# Patient Record
Sex: MALE | Race: WHITE | NOT HISPANIC OR LATINO | Employment: OTHER | URBAN - METROPOLITAN AREA
[De-identification: names, ages, dates, MRNs, and addresses within clinical notes are randomized per-mention and may not be internally consistent; named-entity substitution may affect disease eponyms.]

---

## 2017-03-13 ENCOUNTER — COMMUNICATION - HEALTHEAST (OUTPATIENT)
Dept: INTERNAL MEDICINE | Facility: CLINIC | Age: 62
End: 2017-03-13

## 2017-07-10 ENCOUNTER — RECORDS - HEALTHEAST (OUTPATIENT)
Dept: ADMINISTRATIVE | Facility: OTHER | Age: 62
End: 2017-07-10

## 2017-07-11 ENCOUNTER — RECORDS - HEALTHEAST (OUTPATIENT)
Dept: ADMINISTRATIVE | Facility: OTHER | Age: 62
End: 2017-07-11

## 2018-02-09 ENCOUNTER — COMMUNICATION - HEALTHEAST (OUTPATIENT)
Dept: INTERNAL MEDICINE | Facility: CLINIC | Age: 63
End: 2018-02-09

## 2018-02-11 ENCOUNTER — COMMUNICATION - HEALTHEAST (OUTPATIENT)
Dept: INTERNAL MEDICINE | Facility: CLINIC | Age: 63
End: 2018-02-11

## 2018-05-24 ENCOUNTER — COMMUNICATION - HEALTHEAST (OUTPATIENT)
Dept: INTERNAL MEDICINE | Facility: CLINIC | Age: 63
End: 2018-05-24

## 2018-05-25 ENCOUNTER — OFFICE VISIT - HEALTHEAST (OUTPATIENT)
Dept: INTERNAL MEDICINE | Facility: CLINIC | Age: 63
End: 2018-05-25

## 2018-05-25 DIAGNOSIS — Z00.00 HEALTH CARE MAINTENANCE: ICD-10-CM

## 2018-05-25 LAB — PSA SERPL-MCNC: 2.4 NG/ML (ref 0–4.5)

## 2018-05-25 ASSESSMENT — MIFFLIN-ST. JEOR: SCORE: 1876.65

## 2019-02-22 ENCOUNTER — COMMUNICATION - HEALTHEAST (OUTPATIENT)
Dept: INTERNAL MEDICINE | Facility: CLINIC | Age: 64
End: 2019-02-22

## 2019-05-21 ENCOUNTER — COMMUNICATION - HEALTHEAST (OUTPATIENT)
Dept: INTERNAL MEDICINE | Facility: CLINIC | Age: 64
End: 2019-05-21

## 2019-05-21 DIAGNOSIS — E03.9 HYPOTHYROIDISM, UNSPECIFIED TYPE: ICD-10-CM

## 2019-07-08 ENCOUNTER — RECORDS - HEALTHEAST (OUTPATIENT)
Dept: ADMINISTRATIVE | Facility: OTHER | Age: 64
End: 2019-07-08

## 2019-07-09 ENCOUNTER — RECORDS - HEALTHEAST (OUTPATIENT)
Dept: ADMINISTRATIVE | Facility: OTHER | Age: 64
End: 2019-07-09

## 2019-07-10 ENCOUNTER — RECORDS - HEALTHEAST (OUTPATIENT)
Dept: ADMINISTRATIVE | Facility: OTHER | Age: 64
End: 2019-07-10

## 2019-08-17 ENCOUNTER — COMMUNICATION - HEALTHEAST (OUTPATIENT)
Dept: INTERNAL MEDICINE | Facility: CLINIC | Age: 64
End: 2019-08-17

## 2019-08-17 DIAGNOSIS — E03.9 HYPOTHYROIDISM, UNSPECIFIED TYPE: ICD-10-CM

## 2019-09-20 ENCOUNTER — RECORDS - HEALTHEAST (OUTPATIENT)
Dept: ADMINISTRATIVE | Facility: OTHER | Age: 64
End: 2019-09-20

## 2019-11-13 ENCOUNTER — COMMUNICATION - HEALTHEAST (OUTPATIENT)
Dept: INTERNAL MEDICINE | Facility: CLINIC | Age: 64
End: 2019-11-13

## 2019-11-13 DIAGNOSIS — E03.9 HYPOTHYROIDISM, UNSPECIFIED TYPE: ICD-10-CM

## 2019-12-05 ENCOUNTER — OFFICE VISIT - HEALTHEAST (OUTPATIENT)
Dept: INTERNAL MEDICINE | Facility: CLINIC | Age: 64
End: 2019-12-05

## 2019-12-05 DIAGNOSIS — I42.2 HYPERTROPHIC CARDIOMYOPATHY (H): ICD-10-CM

## 2019-12-05 DIAGNOSIS — D30.01 RENAL ONCOCYTOMA OF RIGHT KIDNEY: ICD-10-CM

## 2019-12-05 DIAGNOSIS — I25.10 CORONARY ARTERY DISEASE INVOLVING NATIVE CORONARY ARTERY OF NATIVE HEART WITHOUT ANGINA PECTORIS: ICD-10-CM

## 2019-12-05 DIAGNOSIS — I10 ESSENTIAL HYPERTENSION: ICD-10-CM

## 2019-12-05 DIAGNOSIS — E78.5 HYPERLIPIDEMIA WITH TARGET LDL LESS THAN 70: ICD-10-CM

## 2019-12-05 DIAGNOSIS — E03.9 HYPOTHYROIDISM, UNSPECIFIED TYPE: ICD-10-CM

## 2019-12-05 LAB
ANION GAP SERPL CALCULATED.3IONS-SCNC: 8 MMOL/L (ref 5–18)
BUN SERPL-MCNC: 13 MG/DL (ref 8–22)
CALCIUM SERPL-MCNC: 9.7 MG/DL (ref 8.5–10.5)
CHLORIDE BLD-SCNC: 100 MMOL/L (ref 98–107)
CO2 SERPL-SCNC: 31 MMOL/L (ref 22–31)
CREAT SERPL-MCNC: 1.13 MG/DL (ref 0.7–1.3)
GFR SERPL CREATININE-BSD FRML MDRD: >60 ML/MIN/1.73M2
GLUCOSE BLD-MCNC: 95 MG/DL (ref 70–125)
POTASSIUM BLD-SCNC: 3.3 MMOL/L (ref 3.5–5)
SODIUM SERPL-SCNC: 139 MMOL/L (ref 136–145)
TSH SERPL DL<=0.005 MIU/L-ACNC: 0.33 UIU/ML (ref 0.3–5)

## 2019-12-05 RX ORDER — HYDROCHLOROTHIAZIDE 50 MG/1
50 TABLET ORAL DAILY
Refills: 0 | Status: SHIPPED
Start: 2019-12-05 | End: 2022-07-26

## 2019-12-05 RX ORDER — DILTIAZEM HYDROCHLORIDE 180 MG/1
180 CAPSULE, COATED, EXTENDED RELEASE ORAL
Status: SHIPPED | COMMUNITY
Start: 2019-09-17 | End: 2024-01-09

## 2019-12-05 ASSESSMENT — MIFFLIN-ST. JEOR: SCORE: 1917.48

## 2019-12-06 ENCOUNTER — AMBULATORY - HEALTHEAST (OUTPATIENT)
Dept: INTERNAL MEDICINE | Facility: CLINIC | Age: 64
End: 2019-12-06

## 2019-12-06 ENCOUNTER — COMMUNICATION - HEALTHEAST (OUTPATIENT)
Dept: INTERNAL MEDICINE | Facility: CLINIC | Age: 64
End: 2019-12-06

## 2019-12-06 DIAGNOSIS — E87.6 LOW SERUM POTASSIUM: ICD-10-CM

## 2019-12-06 DIAGNOSIS — I10 ESSENTIAL HYPERTENSION: ICD-10-CM

## 2019-12-13 ENCOUNTER — RECORDS - HEALTHEAST (OUTPATIENT)
Dept: ADMINISTRATIVE | Facility: OTHER | Age: 64
End: 2019-12-13

## 2020-01-07 ENCOUNTER — RECORDS - HEALTHEAST (OUTPATIENT)
Dept: ADMINISTRATIVE | Facility: OTHER | Age: 65
End: 2020-01-07

## 2020-02-01 ENCOUNTER — COMMUNICATION - HEALTHEAST (OUTPATIENT)
Dept: INTERNAL MEDICINE | Facility: CLINIC | Age: 65
End: 2020-02-01

## 2020-02-01 DIAGNOSIS — E03.9 HYPOTHYROIDISM, UNSPECIFIED TYPE: ICD-10-CM

## 2020-03-27 ENCOUNTER — OFFICE VISIT - HEALTHEAST (OUTPATIENT)
Dept: INTERNAL MEDICINE | Facility: CLINIC | Age: 65
End: 2020-03-27

## 2020-03-27 DIAGNOSIS — I10 ESSENTIAL HYPERTENSION: ICD-10-CM

## 2020-03-27 DIAGNOSIS — I25.119 CORONARY ARTERY DISEASE INVOLVING NATIVE CORONARY ARTERY OF NATIVE HEART WITH ANGINA PECTORIS (H): ICD-10-CM

## 2020-03-27 RX ORDER — NITROGLYCERIN 0.4 MG/1
0.4 TABLET SUBLINGUAL EVERY 5 MIN PRN
Qty: 30 TABLET | Refills: 3 | Status: SHIPPED | OUTPATIENT
Start: 2020-03-27 | End: 2024-01-09

## 2020-03-27 RX ORDER — LISINOPRIL 10 MG/1
10 TABLET ORAL 2 TIMES DAILY
Qty: 180 TABLET | Refills: 11 | Status: SHIPPED
Start: 2020-03-27 | End: 2022-07-26

## 2021-02-03 ENCOUNTER — COMMUNICATION - HEALTHEAST (OUTPATIENT)
Dept: INTERNAL MEDICINE | Facility: CLINIC | Age: 66
End: 2021-02-03

## 2021-02-10 ENCOUNTER — OFFICE VISIT - HEALTHEAST (OUTPATIENT)
Dept: INTERNAL MEDICINE | Facility: CLINIC | Age: 66
End: 2021-02-10

## 2021-02-10 DIAGNOSIS — E66.3 OVERWEIGHT (BMI 25.0-29.9): ICD-10-CM

## 2021-02-10 DIAGNOSIS — I42.2 HYPERTROPHIC CARDIOMYOPATHY (H): ICD-10-CM

## 2021-02-10 DIAGNOSIS — I10 ESSENTIAL HYPERTENSION, BENIGN: ICD-10-CM

## 2021-02-10 DIAGNOSIS — Z86.0100 PERSONAL HISTORY OF COLONIC POLYPS: ICD-10-CM

## 2021-02-10 DIAGNOSIS — E03.9 HYPOTHYROIDISM, UNSPECIFIED TYPE: ICD-10-CM

## 2021-02-10 DIAGNOSIS — R39.12 BENIGN PROSTATIC HYPERPLASIA WITH WEAK URINARY STREAM: ICD-10-CM

## 2021-02-10 DIAGNOSIS — I25.119 CORONARY ARTERY DISEASE INVOLVING NATIVE CORONARY ARTERY OF NATIVE HEART WITH ANGINA PECTORIS (H): ICD-10-CM

## 2021-02-10 DIAGNOSIS — N40.1 BENIGN PROSTATIC HYPERPLASIA WITH WEAK URINARY STREAM: ICD-10-CM

## 2021-02-10 LAB — TSH SERPL DL<=0.005 MIU/L-ACNC: 0.35 UIU/ML (ref 0.3–5)

## 2021-03-31 ENCOUNTER — COMMUNICATION - HEALTHEAST (OUTPATIENT)
Dept: INTERNAL MEDICINE | Facility: CLINIC | Age: 66
End: 2021-03-31

## 2021-03-31 DIAGNOSIS — E03.9 HYPOTHYROIDISM, UNSPECIFIED TYPE: ICD-10-CM

## 2021-03-31 RX ORDER — LEVOTHYROXINE SODIUM 125 UG/1
TABLET ORAL
Qty: 90 TABLET | Refills: 3 | Status: SHIPPED | OUTPATIENT
Start: 2021-03-31 | End: 2022-06-02

## 2021-04-26 ENCOUNTER — RECORDS - HEALTHEAST (OUTPATIENT)
Dept: ADMINISTRATIVE | Facility: OTHER | Age: 66
End: 2021-04-26

## 2021-05-29 NOTE — TELEPHONE ENCOUNTER
RN cannot approve Refill Request    RN can NOT refill this medication overdue for office visits and/or labs.    Antione Champion, [facility] Triage/Med Refill 5/21/2019    Requested Prescriptions   Pending Prescriptions Disp Refills     levothyroxine (SYNTHROID, LEVOTHROID) 125 MCG tablet [Pharmacy Med Name: LEVOTHYROXINE 0.125MG (125MCG) TAB] 90 tablet 0     Sig: TAKE 1 TABLET BY MOUTH DAILY AT 6 AM       Thyroid Hormones Protocol Failed - 5/21/2019 11:41 AM        Failed - TSH on file in past 12 months for patient age 12 & older     TSH   Date Value Ref Range Status   10/30/2015 2.45 0.30 - 5.00 uIU/mL Final                   Passed - Provider visit in past 12 months or next 3 months     Last office visit with prescriber/PCP: Visit date not found OR same dept: Visit date not found OR same specialty: 10/30/2015 Jhonathan Cline MD  Last physical: Visit date not found Last MTM visit: Visit date not found   Next visit within 3 mo: Visit date not found  Next physical within 3 mo: Visit date not found  Prescriber OR PCP: Tae Gomez MD  Last diagnosis associated with med order: There are no diagnoses linked to this encounter.  If protocol passes may refill for 12 months if within 3 months of last provider visit (or a total of 15 months).

## 2021-05-31 NOTE — TELEPHONE ENCOUNTER
RN cannot approve Refill Request    RN can NOT refill this medication PCP messaged that patient is overdue for Labs and Office Visit and Protocol failed and NO refill given.   Sun Vizcarra, [facility] Triage/Med Refill 8/17/2019    Requested Prescriptions   Pending Prescriptions Disp Refills     levothyroxine (SYNTHROID, LEVOTHROID) 125 MCG tablet [Pharmacy Med Name: LEVOTHYROXINE 0.125MG (125MCG) TAB] 90 tablet 0     Sig: TAKE 1 TABLET BY MOUTH DAILY AT 6 AM       Thyroid Hormones Protocol Failed - 8/17/2019 12:36 PM        Failed - Provider visit in past 12 months or next 3 months     Last office visit with prescriber/PCP: 10/30/2015 Jhonathan Cline MD OR same dept: Visit date not found OR same specialty: 10/30/2015 Jhonathan Cline MD  Last physical: 5/25/2018 Last MTM visit: Visit date not found   Next visit within 3 mo: Visit date not found  Next physical within 3 mo: Visit date not found  Prescriber OR PCP: Jhonathan Cline MD  Last diagnosis associated with med order: 1. Hypothyroidism, unspecified type  - levothyroxine (SYNTHROID, LEVOTHROID) 125 MCG tablet [Pharmacy Med Name: LEVOTHYROXINE 0.125MG (125MCG) TAB]; TAKE 1 TABLET BY MOUTH DAILY AT 6 AM  Dispense: 90 tablet; Refill: 0    If protocol passes may refill for 12 months if within 3 months of last provider visit (or a total of 15 months).             Failed - TSH on file in past 12 months for patient age 12 & older     TSH   Date Value Ref Range Status   10/30/2015 2.45 0.30 - 5.00 uIU/mL Final

## 2021-06-01 VITALS — WEIGHT: 220.04 LBS | BODY MASS INDEX: 26.8 KG/M2 | HEIGHT: 76 IN

## 2021-06-03 NOTE — TELEPHONE ENCOUNTER
RN cannot approve Refill Request    RN can NOT refill this medication PCP messaged that patient is overdue for Labs and Office Visit. Last office visit: 10/30/2015 Jhonathan Cline MD Last Physical: 5/25/2018 Last MTM visit: Visit date not found Last visit same specialty: 10/30/2015 Jhonathan Cline MD.  Next visit within 3 mo: Visit date not found  Next physical within 3 mo: Visit date not found      Franchesca LUEVANO Kristina, [facility] Triage/Med Refill 11/13/2019    Requested Prescriptions   Pending Prescriptions Disp Refills     levothyroxine (SYNTHROID, LEVOTHROID) 125 MCG tablet [Pharmacy Med Name: LEVOTHYROXINE 0.125MG (125MCG) TAB] 90 tablet 0     Sig: TAKE 1 TABLET BY MOUTH DAILY AT 6 AM       Thyroid Hormones Protocol Failed - 11/13/2019  8:07 AM        Failed - Provider visit in past 12 months or next 3 months     Last office visit with prescriber/PCP: 10/30/2015 Jhonathan Cline MD OR same dept: Visit date not found OR same specialty: 10/30/2015 Jhonathan Cline MD  Last physical: 5/25/2018 Last MTM visit: Visit date not found   Next visit within 3 mo: Visit date not found  Next physical within 3 mo: Visit date not found  Prescriber OR PCP: Jhonathan Cline MD  Last diagnosis associated with med order: 1. Hypothyroidism, unspecified type  - levothyroxine (SYNTHROID, LEVOTHROID) 125 MCG tablet [Pharmacy Med Name: LEVOTHYROXINE 0.125MG (125MCG) TAB]; TAKE 1 TABLET BY MOUTH DAILY AT 6 AM  Dispense: 90 tablet; Refill: 0    If protocol passes may refill for 12 months if within 3 months of last provider visit (or a total of 15 months).             Failed - TSH on file in past 12 months for patient age 12 & older     TSH   Date Value Ref Range Status   10/30/2015 2.45 0.30 - 5.00 uIU/mL Final

## 2021-06-04 VITALS
OXYGEN SATURATION: 97 % | DIASTOLIC BLOOD PRESSURE: 92 MMHG | WEIGHT: 229.04 LBS | HEIGHT: 76 IN | SYSTOLIC BLOOD PRESSURE: 145 MMHG | HEART RATE: 56 BPM | BODY MASS INDEX: 27.89 KG/M2

## 2021-06-05 NOTE — TELEPHONE ENCOUNTER
Refill Approved    Rx renewed per Medication Renewal Policy. Medication was last renewed on11/13/19 .    Zaida Dela Cruz, [facility] Triage/Med Refill 2/2/2020     Requested Prescriptions   Pending Prescriptions Disp Refills     levothyroxine (SYNTHROID, LEVOTHROID) 125 MCG tablet [Pharmacy Med Name: LEVOTHYROXINE 0.125MG (125MCG) TAB] 90 tablet 0     Sig: TAKE 1 TABLET BY MOUTH DAILY AT 6 AM       Thyroid Hormones Protocol Passed - 2/1/2020  8:33 AM        Passed - Provider visit in past 12 months or next 3 months     Last office visit with prescriber/PCP: 12/5/2019 Jhonathan Cline MD OR same dept: 12/5/2019 Jhonathan Cline MD OR same specialty: 12/5/2019 Jhonathan Cline MD  Last physical: 5/25/2018 Last MTM visit: Visit date not found   Next visit within 3 mo: Visit date not found  Next physical within 3 mo: Visit date not found  Prescriber OR PCP: Jhonathan Cline MD  Last diagnosis associated with med order: 1. Hypothyroidism, unspecified type  - levothyroxine (SYNTHROID, LEVOTHROID) 125 MCG tablet [Pharmacy Med Name: LEVOTHYROXINE 0.125MG (125MCG) TAB]; TAKE 1 TABLET BY MOUTH DAILY AT 6 AM  Dispense: 90 tablet; Refill: 0    If protocol passes may refill for 12 months if within 3 months of last provider visit (or a total of 15 months).             Passed - TSH on file in past 12 months for patient age 12 & older     TSH   Date Value Ref Range Status   12/05/2019 0.33 0.30 - 5.00 uIU/mL Final

## 2021-06-07 NOTE — PROGRESS NOTES
"Jan Reyers is a 64 y.o. male who is being evaluated via a billable telephone visit.      The patient has been notified of following:     \"This telephone visit will be conducted via a call between you and your physician/provider. We have found that certain health care needs can be provided without the need for a physical exam.  This service lets us provide the care you need with a short phone conversation.  If a prescription is necessary we can send it directly to your pharmacy.  If lab work is needed we can place an order for that and you can then stop by our lab to have the test done at a later time.    If during the course of the call the physician/provider feels a telephone visit is not appropriate, you will not be charged for this service.\"         Jan Reyers complains of    Chief Complaint   Patient presents with     Follow-up     3 month follow up- blood pressure       I have reviewed and updated the patient's Past Medical History, Social History, Family History and Medication List.    ALLERGIES  Patient has no known allergies.    Additional provider notes: Patient is having some indigestion  Epigastric area when he is on his aggressive walking program.  It will go away in about 10 minutes.  He recently in September underwent a stress test.  He went 11 minutes.  He has known subendocardial type changes.  He does have a stent    Things have not progressed since the time of his last stress test    He has not had any evaristo chest pain or shortness of breath.    No headache or TIA symptomatology.    Assessment/Plan:  Ischemic heart disease with history of stents.  I suspect he has warm of angina.  He will take his a.m. lisinopril and a.m. hydrochlorothiazide after his walk.  Will start walking at a slow pace before he accelerates.  I have also given him some nitroglycerin to have on hand which he knows how to use but has never done so    Hypertension improved control with increase lisinopril    Hyperlipidemia on " Rx    He will email me in a week to let me know how things are.  He does have an upcoming stress test in July at the [facility]    Medications hydrochlorothiazide 50  Lisinopril 10 twice daily  Metoprolol 12.5.  Aspirin 81   Phone call duration:  18 minutes    Remedios Gee, [facility]

## 2021-06-14 NOTE — TELEPHONE ENCOUNTER
New Appointment Needed  What is the reason for the visit:    New patient est care request  Provider Preference: Dr. Wilkerson - Dr. Harper made a recommendation  How soon do you need to be seen?: as soon as possible for AWV  Waitlist offered?: No  Okay to leave a detailed message:  Yes

## 2021-06-15 NOTE — PATIENT INSTRUCTIONS - HE
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  He was previously working with Dr. Aly Maddox.  Issues are as follows: Hypertrophic cardiomyopathy and coronary artery disease, with history of two-vessel coronary bypass grafting in 2004, not experiencing any cardiac symptoms.  Hyperlipidemia in the context of coronary disease, on high intensity rosuvastatin with great control of lipids when measured 6/1/2020.  Hypertension, systolic blood pressure a little elevated today, currently on combination of lisinopril 10 mg once a day and diltiazem extended release 180 mg once a day and hydrochlorothiazide 50 mg once a day.  Renal oncocytoma, status post resection of 2 lesions in 2014, slight increase in the left-sided mass by CT scan 6/1/2020, continue annual surveillance.  Abdominal aortic aneurysm measured 3.5 cm by CT scan June 2020, continue annual surveillance.  Benign prostatic hyperplasia, he struggles a bit empty his bladder completely.  Personal history of tubular adenoma colon polyp November 2015 done at [facility], due for recheck.  Got Covid vaccine number 1/1/2021, will get second shot February 18.  He should get additional vaccinations afterwards for pneumococcal polysaccharide 23, shingles (2 shots of the recombinant vaccine administered 3 months apart), and could also consider getting a tetanus booster.    Today lets get a TSH level so that we can make sure is on the right dose of levothyroxine.    Going issue by issue:    Hypertrophic cardiomyopathy and coronary artery disease, with history of two-vessel coronary bypass grafting in 2004, not experiencing any cardiac symptoms.  His [facility] cardiologist is Dr. Antione Grijalva.  Cardiac MRI July 2019 reported hypertrophic cardiomyopathy with 21 mm septal thickness.  Holter monitor July 2019 with rare supraventricular  ectopy and no significant ventricular ectopy.  Lisinopril was discontinued at that time, but Dr. Maddox added back in 2020 because of blood pressure elevation.  Echocardiogram July 2019 reported resting gradient of 6 mm increasing to 36 mmHg with squat to stand maneuver.  He has not had any severe rhythm events, and does not have any implanted devices.  No fainting spells.  No known family history of hypertrophic cardiomyopathy or sudden cardiac death.    Regarding coronary disease, he had an exercise stress test with nuclear myocardial imaging 6/1/2020, which demonstrated an excellent exercise tolerance, going 12.7 minutes on the Reji protocol, 142% of predicted functional aerobic capacity.  The radionuclide imaging showed no signs of stress-induced ischemia.  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  I told Mr. 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    Hyperlipidemia in the context of coronary disease, on high intensity rosuvastatin with great control of lipids when measured 6/1/2020.  The patient's recent lipid panel showed an HDL of 51 mg/dL and an LDL of 70 mg/dL. He can remain on rosuvastatin 40 mg daily.    Hypertension, systolic blood pressure a little elevated today, currently on combination of lisinopril 10 mg once a day and diltiazem extended release 180 mg once a day and hydrochlorothiazide 50 mg once a day.  Today's in clinic blood pressure was 156/81.  I think he might consider bumping up the lisinopril from 10 up to 20 mg a day.  I would favor being pretty aggressive with blood pressure control, aiming for consistent 120/80 at rest.  Kidney function has been satisfactory, and has been maintaining his potassium levels even though he  is on a pretty hefty dose of hydrochlorothiazide.    The blood pressure is much better if he is consistent with exercise.  That may be the best way to manage.  I also reminded him about the importance of controlling dietary sodium intake, and also best to avoid alcohol.    Renal oncocytoma, status post resection of 2 lesions in 2014, slight increase in the left-sided mass by CT scan 6/1/2020, continue annual surveillance.  CT scan of 6/1/2020 reported that the heterogeneous enhancing mass in the lower pole of the left kidney medially measured 2.3 cm in greatest diameter.  Also postoperative changes of partial right nephrectomy.  Stable small at all aortic aneurysm measuring 3.5 cm greatest diameter.    Abdominal aortic aneurysm measured 3.5 cm by CT scan June 2020, continue annual surveillance.      Benign prostatic hyperplasia, he struggles a bit empty his bladder completely.  I told him there are medication options to treat BPH to help empty his bladder.  One option might be tamsulosin 0.4 mg a day, although tamsulosin may lower blood pressure.  If he were to start tamsulosin, he may be necessary to back down on one of his other blood pressure medicines    Personal history of tubular adenoma colon polyp November 2015 done at [facility], due for recheck.  He received a reminder letter from [facility], and will call to schedule    Overweight with body mass index of 28.37.  I would like to see him lose about 15 to 20 pounds in 2021.  That will help with blood pressure control, take a load off his heart, help with cholesterol numbers too.  I reminded about the importance of eating slower, controlling portion size, and identifying problem foods to curtail her limited, such as starches, sweets, and fried foods.  I reminded him that alcohol is a source of carbohydrate calories.    Got Covid vaccine number 1/1/2021, will get second shot February 18.    He should get additional vaccinations afterwards for  pneumococcal polysaccharide 23, shingles (2 shots of the recombinant vaccine administered 3 months apart), and could also consider getting a tetanus booster.

## 2021-06-15 NOTE — TELEPHONE ENCOUNTER
Patient scheduled with Dr. Early for 2/10/2021.  Angely Anderson CMA ............... 12:55 PM, 02/04/21

## 2021-06-15 NOTE — PROGRESS NOTES
Office Visit - Follow Up   Jan Reyers   65 y.o. 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  He was previously working with Dr. Aly Maddox.  Issues are as follows: Hypertrophic cardiomyopathy and coronary artery disease, with history of two-vessel coronary bypass grafting in 2004, not experiencing any cardiac symptoms.  Hyperlipidemia in the context of coronary disease, on high intensity rosuvastatin with great control of lipids when measured 6/1/2020.  Hypertension, systolic blood pressure a little elevated today, currently on combination of lisinopril 10 mg once a day and diltiazem extended release 180 mg once a day and hydrochlorothiazide 50 mg once a day.  Renal oncocytoma, status post resection of 2 lesions in 2014, slight increase in the left-sided mass by CT scan 6/1/2020, continue annual surveillance.  Abdominal aortic aneurysm measured 3.5 cm by CT scan June 2020, continue annual surveillance.  Benign prostatic hyperplasia, he struggles a bit empty his bladder completely.  Personal history of tubular adenoma colon polyp November 2015 done at [facility], due for recheck. Overweight with body mass index of 28.37.  Got Covid vaccine number 1/1/2021, will get second shot February 18.  He should get additional vaccinations afterwards for pneumococcal polysaccharide 23, shingles (2 shots of the recombinant vaccine administered 3 months apart), and could also consider getting a tetanus booster.    Today lets get a TSH level so that we can make sure is on the right dose of levothyroxine.    Going issue by issue:    Hypertrophic  cardiomyopathy and coronary artery disease, with history of two-vessel coronary bypass grafting in 2004, not experiencing any cardiac symptoms.  His [facility] cardiologist is Dr. Antione Grijalva.  Cardiac MRI July 2019 reported hypertrophic cardiomyopathy with 21 mm septal thickness.  Holter monitor July 2019 with rare supraventricular ectopy and no significant ventricular ectopy.  Lisinopril was discontinued at that time, but Dr. Maddox added back in 2020 because of blood pressure elevation.  Echocardiogram July 2019 reported resting gradient of 6 mm increasing to 36 mmHg with squat to stand maneuver.  He has not had any severe rhythm events, and does not have any implanted devices.  No fainting spells.  No known family history of hypertrophic cardiomyopathy or sudden cardiac death.    Regarding coronary disease, he had an exercise stress test with nuclear myocardial imaging 6/1/2020, which demonstrated an excellent exercise tolerance, going 12.7 minutes on the Reji protocol, 142% of predicted functional aerobic capacity.  The radionuclide imaging showed no signs of stress-induced ischemia.  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  I told Mr. 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    Hyperlipidemia in the context of coronary disease, on high intensity rosuvastatin with great control of lipids when measured 6/1/2020.  The patient's recent lipid panel showed an HDL of 51 mg/dL and an LDL of 70 mg/dL. He can remain on rosuvastatin 40 mg daily.    Hypertension, systolic blood pressure a little elevated today, currently on combination of lisinopril 10 mg once a day and diltiazem extended release 180 mg once a day and hydrochlorothiazide 50 mg once  a day.  Today's in clinic blood pressure was 156/81.  I think he might consider bumping up the lisinopril from 10 up to 20 mg a day.  I would favor being pretty aggressive with blood pressure control, aiming for consistent 120/80 at rest.  Kidney function has been satisfactory, and has been maintaining his potassium levels even though he is on a pretty hefty dose of hydrochlorothiazide.    The blood pressure is much better if he is consistent with exercise.  That may be the best way to manage.  I also reminded him about the importance of controlling dietary sodium intake, and also best to avoid alcohol.    Renal oncocytoma, status post resection of 2 lesions in 2014, slight increase in the left-sided mass by CT scan 6/1/2020, continue annual surveillance.  CT scan of 6/1/2020 reported that the heterogeneous enhancing mass in the lower pole of the left kidney medially measured 2.3 cm in greatest diameter.  Also postoperative changes of partial right nephrectomy.  Stable small at all aortic aneurysm measuring 3.5 cm greatest diameter.    Abdominal aortic aneurysm measured 3.5 cm by CT scan June 2020, continue annual surveillance.      Benign prostatic hyperplasia, he struggles a bit empty his bladder completely.  I told him there are medication options to treat BPH to help empty his bladder.  One option might be tamsulosin 0.4 mg a day, although tamsulosin may lower blood pressure.  If he were to start tamsulosin, he may be necessary to back down on one of his other blood pressure medicines    Personal history of tubular adenoma colon polyp November 2015 done at [facility], due for recheck.  He received a reminder letter from [facility], and will call to schedule    Overweight with body mass index of 28.37.  I would like to see him lose about 15 to 20 pounds in 2021.  That will help with blood pressure control, take a load off his heart, help with cholesterol numbers too.  I reminded about the importance  of eating slower, controlling portion size, and identifying problem foods to curtail her limited, such as starches, sweets, and fried foods.  I reminded him that alcohol is a source of carbohydrate calories.    Got Covid vaccine number 1/1/2021, will get second shot February 18.    He should get additional vaccinations afterwards for pneumococcal polysaccharide 23, shingles (2 shots of the recombinant vaccine administered 3 months apart), and could also consider getting a tetanus booster.      --------------------------------------------------------------------------------------------------------------------------  History of Present Illness  This 65 y.o. old     Establishing primary care for this 65-year-old man, retired [employer], who also gets medical care at [facility] from the executive health program and also [facility] cardiology.  He was previously working with Dr. Aly Maddox.  Issues are as follows: Hypertrophic cardiomyopathy and coronary artery disease, with history of two-vessel coronary bypass grafting in 2004, not experiencing any cardiac symptoms.  Hyperlipidemia in the context of coronary disease, on high intensity rosuvastatin with great control of lipids when measured 6/1/2020.  Hypertension, systolic blood pressure a little elevated today, currently on combination of lisinopril 10 mg once a day and diltiazem extended release 180 mg once a day and hydrochlorothiazide 50 mg once a day.  Renal oncocytoma, status post resection of 2 lesions in 2014, slight increase in the left-sided mass by CT scan 6/1/2020, continue annual surveillance.  Abdominal aortic aneurysm measured 3.5 cm by CT scan June 2020, continue annual surveillance.  Benign prostatic hyperplasia, he struggles a bit empty his bladder completely.  Personal history of tubular adenoma colon polyp November 2015 done at [facility], due for recheck. Overweight with body mass index of 28.37.  Got Covid vaccine number 1/1/2021, will  get second shot February 18.  He should get additional vaccinations afterwards for pneumococcal polysaccharide 23, shingles (2 shots of the recombinant vaccine administered 3 months apart), and could also consider getting a tetanus booster.      [place]:  Bandar Alford M.D., M.S. 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 He also has [facility] qualification for hypertension and hypothyroidism requiring replacement.    coronary artery disease and a very slowly increasing oncocytoma involving the right kidney. Mr. Reyers underwent a two-vessel CABG in 2004 and has remained asymptomatic without anginal or congestive symptoms. He also has a previously reported history of hypothyroidism stable on thyroid replacement, and hypertension controlled with lisinopril and hydrochlorothiazide.     He underwent an exercise stress test here on June 1, 2020 during which time he exercised for 12.7 minutes on a full Reji protocol (142% predicted functional aerobic capacity) and the radionuclide imaging showed no evidence for stress-induced ischemia. There was no evidence for left ventricular regional wall motion abnormalities and his ejection fraction was normal at 64%  he has sigmoid variant hypertrophic cardiomyopathy with very mild obstruction that is clearly not clinically significant.     PAST MEDICAL/SURGICAL HISTORY  1. Right renal oncocytoma with resection of two lesions in 2014.  2. Left renal cyst.  3. Coronary artery disease.  4. Hypertension.  5. Hyperlipidemia.    PAST SURGICAL HISTORY  1. PTCA in 2000.  2. CABG (2-vessel) in 2004.  3. Tonsillectomy.  4. Vasectomy.    Hypothyroidism, on replacement  TSH noted to be 0.4 mIU/L    Hypertension    BP Readings from Last 3 Encounters:   02/10/21 156/81   12/05/19 (!) 145/92   05/25/18 128/76     Home 118-135/  65-90    Hyperlipidemia  Well-controlled lipids on current dose of Crestor.  Lipid panel done at [facility] 6/1/2020 had total cholesterol 138, triglycerides 83, HDL 51, LDL 70    Renal oncocytoma, s/p resection of two lesions in 2014  His CT imaging study of the abdomen did show a slight increase in the left-sided mass that is being followed closely by Urology. Ablation was offered, but surveillance will be pursued at this juncture. He will need another CT imaging study done by next year and a follow-up visit with our colleagues in urology as well.     6-1-2020  1. Continued gradual enlargement of the heterogeneous enhancing mass involving  the lower pole of the left kidney medially. This currently measures 2.3cm in  greatest diameter.  2. Postoperative changes of partial right nephrectomy. No CT findings of  metastatic disease in the abdomen or pelvis.  3. Stable small abdominal aortic aneurysm measuring 3.5cm greatest diameter.    06/02/2020 Comprehensive Visit Department of Cardiovascular Medicine in [place], [place]    [address] [place] [address]    [phone]   Antione Grijalva M.D.      Cardiomyopathy Obstructive Hypertrophic (HCC) (Primary Dx);   Atherosclerotic Heart Disease Of Native Coronary Artery Without Angina Pectoris;   Hypertension Essential Primary;   Hyperlipidemia;   Aneurysm Abdominal Aortic Without Rupture (HCC)    His background includes 2 vessel coronary artery bypass grafting in 2004. He has hypertension and hyperlipidemia. He has prior abnormal stress tests, with a small fixed basal defect on nuclear studies both July 17, 2018 and September 20, 2019. These studies have not demonstrated ischemia. He has had normal left ventricular function.    Last year, he underwent an evaluation for hypertrophic cardiomyopathy after transthoracic echocardiogram July 8, 2019 revealed an 18 mm basal septum with a left ventricular outflow tract gradient of 6 mmHg at rest increasing to  36 mmHg with squat to stand. He had systolic anterior motion of the mitral valve with mild mitral regurgitation at rest. A cardiac MRI on July 10, 2019 showed evidence of sigmoid type hypertrophic cardiomyopathy with a 21 mm septal thickness and minimal late gadolinium enhancement. The Holter monitor July 12, 2019 showed rare supraventricular ectopy and no significant ventricular ectopy. The patient was felt to be low risk for sudden cardiac death and was minimally symptomatic. Lisinopril was discontinued at that time, and the patient was placed on diltiazem.      Sigmoid variant hypertrophic cardiomyopathy with mild obstruction  The patient's hypertrophic cardiomyopathy appears stable. Despite taking hydrochlorothiazide 50 mg daily and lisinopril 10 mg daily, he does not have any exertional symptoms at the current time. His exercise capacity has been excellent.      Coronary artery disease status post coronary artery bypass grafting (2004)    Abdominal aortic aneurysm (3.5 cm in diameter)  The patient had a CTt showed a 3.5 cm abdominal aortic aneurysm. This has been stable. He will need an ultrasound for surveillance in 1 year.      BPH      Wt Readings from Last 3 Encounters:   02/10/21 (!) 230 lb (104.3 kg)   12/05/19 (!) 229 lb 0.6 oz (103.9 kg)   05/25/18 220 lb 0.6 oz (99.8 kg)     BP Readings from Last 3 Encounters:   02/10/21 156/81   12/05/19 (!) 145/92   05/25/18 128/76       Lab Results   Component Value Date     12/05/2019    K 3.3 (L) 12/05/2019     12/05/2019    CREATININE 1.13 12/05/2019    BUN 13 12/05/2019    CO2 31 12/05/2019    TSH 0.33 12/05/2019    PSA 2.4 05/25/2018      Got COVID #1 Last week January 2021  #2 Feb 18th    Colon polyp  Colonoscopy 11/6/2015 done at [place] [facility]  3 mm polyp at the hepatic flexure, tubular adenoma, recheck 5 years which would be November 2020.    Immunization History   Administered Date(s) Administered     INFLUENZA,RECOMBINANT,INJ,PF QUADRIVALENT  18+YRS 2019     INFLUENZA,SEASONAL QUAD, PF, =/> 6months 10/31/2014     Influenza [id number], 2010     Influenza,seasonal,quad inj =/> 6months 10/30/2015, 10/20/2020     Pneumo Conj 13-V (2010&after) 10/30/2015     Review of Systems: A comprehensive review of systems was negative except as noted.  ---------------------------------------------------------------------------------------------------------------------------    Medications, Allergies, Social, and Problem List   Current Outpatient Medications   Medication Sig Dispense Refill     aspirin 81 MG EC tablet Take 81 mg by mouth daily.       CRESTOR 40 mg tablet Take 40 mg by mouth daily.        hydroCHLOROthiazide (HYDRODIURIL) 50 MG tablet Take 1 tablet (50 mg total) by mouth daily.  0     levothyroxine (SYNTHROID, LEVOTHROID) 125 MCG tablet TAKE 1 TABLET BY MOUTH DAILY AT 6 AM 90 tablet 3     nitroglycerin (NITROSTAT) 0.4 MG SL tablet Place 1 tablet (0.4 mg total) under the tongue every 5 (five) minutes as needed for chest pain. 30 tablet 3     diltiazem (CARDIZEM CD) 180 MG 24 hr capsule Take 180 mg by mouth.       lisinopriL (ZESTRIL) 10 MG tablet Take 1 tablet (10 mg total) by mouth 2 (two) times a day. (Patient taking differently: Take 10 mg by mouth daily. ) 180 tablet 11     No current facility-administered medications for this visit.      No Known Allergies  Social History     Tobacco Use     Smoking status: Former Smoker     Quit date: 10/30/1965     Years since quittin.3     Smokeless tobacco: Never Used   Substance Use Topics     Alcohol use: Not on file     Drug use: Not on file     Patient Active Problem List   Diagnosis     CAD (coronary artery disease)     Renal oncocytoma of right kidney     Hyperlipidemia LDL goal < 70     Hypothyroidism        Reviewed, reconciled and updated       Physical Exam   General Appearance:   Very pleasant, appears well, bit overweight, note mildly elevated systolic blood pressure.    /81 (Patient  Site: Left Arm, Patient Position: Sitting, Cuff Size: Adult Large)   Pulse (!) 56   Temp 96.9  F (36.1  C) (Other) Comment (Src): forehead  Wt (!) 230 lb (104.3 kg)   SpO2 99%   BMI 28.37 kg/m      General: Alert, in no distress  Skin: No significant lesion seen.  Eyes/nose/throat: Eyes without scleral icterus, eye movements normal, pupils equal and reactive, oropharynx clear, ears with normal TM's  MSK: Neck with good ROM  Lymphatic: Neck without adenopathy or masses  Endocrine: Thyroid with no nodules to palpation  Pulm: Lungs clear to auscultation bilaterally  Cardiac: Heart with regular rate and rhythm, soft 2/6 systolic murmur heard across the precordium, and I also heard occasional skip of a PVC.  GI: Abdomen soft, nontender. No palpable enlargement of liver or spleen  MSK: Extremities no tenderness or edema  Neuro: Moves all extremities, without focal weakness  Psych: Alert, normal mental status. Normal affect and speech       Additional Information   I spent 40 minutes on this encounter, including reviewing interval history since our last visit, examining the patient, explaining and counseling the issues enumerated in the Assessment and Plan (patient given a copy), ordering indicated tests, ordering prescriptions, ordering referrals.       Christopher Early MD

## 2021-06-16 PROBLEM — E66.3 OVERWEIGHT (BMI 25.0-29.9): Status: ACTIVE | Noted: 2021-02-10

## 2021-06-16 PROBLEM — N40.1 BENIGN PROSTATIC HYPERPLASIA WITH WEAK URINARY STREAM: Status: ACTIVE | Noted: 2021-02-10

## 2021-06-16 PROBLEM — I10 ESSENTIAL HYPERTENSION, BENIGN: Status: ACTIVE | Noted: 2021-02-10

## 2021-06-16 PROBLEM — Z86.0100 PERSONAL HISTORY OF COLONIC POLYPS: Status: ACTIVE | Noted: 2021-02-10

## 2021-06-16 PROBLEM — I42.2 HYPERTROPHIC CARDIOMYOPATHY (H): Status: ACTIVE | Noted: 2021-02-10

## 2021-06-16 PROBLEM — R39.12 BENIGN PROSTATIC HYPERPLASIA WITH WEAK URINARY STREAM: Status: ACTIVE | Noted: 2021-02-10

## 2021-06-17 NOTE — PATIENT INSTRUCTIONS - HE
Patient Instructions by Jhonathan Cline MD at 12/5/2019 10:20 AM     Author: Jhonathan Cline MD Service: -- Author Type: Physician    Filed: 12/5/2019 10:46 AM Encounter Date: 12/5/2019 Status: Signed    : Jhonathan Cline MD (Physician)       MR Cardiac without and with IV Contrast7/10/2019  [facility]  Result Impression     1. Findings consistent with sigmoid subtype hypertrophic cardiomyopathy with a  maximal wall thickness of 21 mm at the basal septum. Mild flow disturbance in  the LV outflow tract but no RACHEAL was identified. There is mitral regurgitation.  2. Small subendocardial infarct in the lateral wall and minimal scarring in the  inferoseptum at the inferior RV insertion site.  3. Ascending aortic dilatation, 45 mm at the mid ascending aorta.   Result Narrative

## 2021-06-18 NOTE — PROGRESS NOTES
HISTORY/PHYSICAL EXAM  Jan Reyers   62 y.o. male  Is here for a physical healthcare maintenance examination        Assessment/Plan for  Jan Reyers is a 62 y.o. male.       1.  Healthcare maintenance examination  2.  Ischemic heart disease stable  3.  Onychocytoma kidney-followed stable  4.  Hyperlipidemia-not at target goal-consider [facility] 9      Plan:  1.  PSA today  2.  Other lab at time of flight physical which is complete.  She is every July  3.  Annual examination every October 30, 2019  4.  Annual cardiology/urology/flight physical exams annually every summer [facility]        Patient Instructions   Ask cardiology about PCSK9 cholesterol lowering medication  In addition to crestor    Annual rectal and PSA           Diagnoses and all orders for this visit:    Health care maintenance  -     PSA (Prostatic-Specific Antigen), Annual Screen            Medications:  Medications after visit  Current Outpatient Prescriptions   Medication Sig Dispense Refill     aspirin 81 MG EC tablet Take 81 mg by mouth daily.       CRESTOR 40 mg tablet Take 20 mg by mouth daily.        hydrochlorothiazide (HYDRODIURIL) 25 MG tablet Take 25 mg by mouth daily.        levothyroxine (SYNTHROID, LEVOTHROID) 125 MCG tablet Take 1 tablet (125 mcg total) by mouth Daily at 6:00 am. 90 tablet 3     lisinopril (PRINIVIL,ZESTRIL) 20 MG tablet Take 20 mg by mouth daily.        No current facility-administered medications for this visit.               Jhonathan Cline MD  Internal medicine  [facility]  [phone]  [email or web address]      This is an electronically verified report by Jhonathan Cline M.D.  (Note created with Dragon voice recognition and unintended spelling errors and word substitutions may occur)        SUBJECTIVE/HPI    Chief Complaint:  Annual Exam (Fasting)  Patient here for annual exam  Lastly 2015  His surgery was successful in his kidneys  He is being monitored closely.  He had a partial  right nephrectomy.  He has a stable lesion on the left kidney which is being followed    No cardiovascular symptoms.  Recent stress test reviewed negative    Annual flight physical reviewed results [facility].  All labs done except no PSA    His LDL is not at target goal he is on 40 mg Crestor        Review of Systems:   Extensive 14-point comprehensive review of systems was performed.   Patient reports  1.  Active.  Feeling well.    Drinks a lot of water  1 cup of caffeine a day  Does have nocturia 2-3 times at bedtime  No symptoms of sleep apnea    He is off beta-blockers    Otherwise, the following systems are negative including constitutional, eyes, ears, nose and throat, cardiovascular, respiratory, gastrointestinal, genitourinary, musculoskeletal,neurological, skin and/or breast, endocrine, hematologic/lymph, allergic/immunologic and psychiatric.  Surgical History  No past surgical history on file.    Medical History     No past medical history on file.  Patient Active Problem List    Diagnosis Date Noted     CAD (coronary artery disease) 10/30/2015     Renal oncocytoma of right kidney 10/30/2015     Hyperlipidemia LDL goal < 70 10/30/2015     Hypothyroidism 10/30/2015        Family History  No family history on file.          Medications:  Current Outpatient Prescriptions on File Prior to Visit   Medication Sig     aspirin 81 MG EC tablet Take 81 mg by mouth daily.     CRESTOR 40 mg tablet Take 20 mg by mouth daily.      hydrochlorothiazide (HYDRODIURIL) 25 MG tablet Take 25 mg by mouth daily.      levothyroxine (SYNTHROID, LEVOTHROID) 125 MCG tablet Take 1 tablet (125 mcg total) by mouth Daily at 6:00 am.     lisinopril (PRINIVIL,ZESTRIL) 20 MG tablet Take 20 mg by mouth daily.      [DISCONTINUED] levothyroxine (SYNTHROID, LEVOTHROID) 125 MCG tablet TAKE 1 TABLET BY MOUTH DAILY AT 6:00AM.     [DISCONTINUED] metoprolol succinate (TOPROL-XL) 25 MG Take 25 mg by mouth daily.      No current  "facility-administered medications on file prior to visit.           Allergies:  No Known Allergies    PSFHx:   Social History     Social History     Marital status:      Spouse name: N/A     Number of children: N/A     Years of education: N/A     Occupational History     Not on file.     Social History Main Topics     Smoking status: Former Smoker     Quit date: 10/30/1965     Smokeless tobacco: Never Used     Alcohol use Not on file     Drug use: Not on file     Sexual activity: Not on file     Other Topics Concern     Not on file     Social History Narrative               Objective:   /76 (Patient Site: Left Arm, Patient Position: Sitting, Cuff Size: Adult Regular)  Pulse (!) 51  Resp 14  Ht 6' 3.5\" (1.918 m)  Wt 220 lb 0.6 oz (99.8 kg)  SpO2 97%  BMI 27.14 kg/m2  Weight:   Wt Readings from Last 3 Encounters:   05/25/18 220 lb 0.6 oz (99.8 kg)   10/30/15 (!) 230 lb 0.6 oz (104.3 kg)       General-appears well, no acute distress.  Skin: Normal. No rash or lesion  Lymph Nodes: None palpable-including neck, axilla, inguinal, epitrochlear.  Head:  Normocephalic.    Eyes: Midline.  Equal size., full ROM.  External exams normal.  No icterus  Ears:  Normal pinnae, canals, and TM's.    Nose:  Patent, without deformity.    Throat:  Moist mucous membranes without lesions, erythema, or exudate.    Neck: No palpable masses, lymphadenopathy or tenderness.No thyromegaly or goiter.  No thyroid nodule.  Carotid Arteries:  No Bruit.  Carotid upstroke normal  Chest Wall: No deformity or pain elicited on compression.  Respiratory:  Normal respiratory effort.  Lungs are clear with good breath sounds.  No dullness.  No wheezing.  Heart: Regular rhythm.  Normal sounding S1, S2 without S3, S4, murmurs, rubs, or gallops.    Abdomen:  The abdomen was flat, soft and nontender without guarding rebound or masses.  There are normal bowel sounds.  There is no hepatosplenomegaly.  There is no palpable enlargement of the " aorta.  Rectal/Prostate:  No external lesions.  Sphincter tone normal.  No palpable rectal lesions.    Prostate 2/4 BPH without nodule, smooth, nontender without palpable lesions.  Extremities:  Full ROM without limitation, deformity or edema.    4+ pulses  Neurologic-intact- No focal deficit.  Speech clear.  Coordination normal.  Strength symmetric  Orthopedic-no arthropathy.          Laboratory: Ordered PSA  [facility]  He has upcoming labs 7/2018 scheduled.  No results found for this or any previous visit (from the past 24 hour(s)).    RADIOLOGY: No results found.    Radiology: Reviewed echocardiogram  Reviewed ECG  Reviewed abdominal ultrasound  Reviewed CT abdomen kidney follow-up    Electrocardiogram:

## 2021-06-24 NOTE — TELEPHONE ENCOUNTER
RN cannot approve Refill Request    RN can NOT refill this medication Protocol failed and NO refill given. Last office visit: 10/30/2015 Jhonathan Cline MD Last Physical: 5/25/2018 Last MTM visit: Visit date not found Last visit same specialty: 10/30/2015 Jhonathan Cline MD.  Next visit within 3 mo: Visit date not found  Next physical within 3 mo: Visit date not found      Laura Hammonds, [facility] Triage/Med Refill 2/24/2019    Requested Prescriptions   Pending Prescriptions Disp Refills     levothyroxine (SYNTHROID, LEVOTHROID) 125 MCG tablet [Pharmacy Med Name: LEVOTHYROXINE 0.125MG (125MCG) TAB] 90 tablet 0     Sig: TAKE 1 TABLET BY MOUTH DAILY AT 6AM    Thyroid Hormones Protocol Failed - 2/22/2019  8:51 AM       Failed - TSH on file in past 12 months for patient age 12 & older    TSH   Date Value Ref Range Status   10/30/2015 2.45 0.30 - 5.00 uIU/mL Final                  Passed - Provider visit in past 12 months or next 3 months    Last office visit with prescriber/PCP: 10/30/2015 Jhonathan Cline MD OR same dept: Visit date not found OR same specialty: 10/30/2015 Jhonathan Cline MD  Last physical: 5/25/2018 Last MTM visit: Visit date not found   Next visit within 3 mo: Visit date not found  Next physical within 3 mo: Visit date not found  Prescriber OR PCP: Jhonathan Cline MD  Last diagnosis associated with med order: There are no diagnoses linked to this encounter.  If protocol passes may refill for 12 months if within 3 months of last provider visit (or a total of 15 months).

## 2021-06-27 ENCOUNTER — HEALTH MAINTENANCE LETTER (OUTPATIENT)
Age: 66
End: 2021-06-27

## 2021-06-28 NOTE — PROGRESS NOTES
Progress Notes by Jhonathan Cline MD at 12/5/2019 10:20 AM     Author: Jhonathan Cline MD Service: -- Author Type: Physician    Filed: 12/5/2019 12:24 PM Encounter Date: 12/5/2019 Status: Signed    : Jhonathan Cline MD (Physician)       OFFICE VISIT NOTE  Jan Reyers   64 y.o. male            Assessment/Plan for  Jan Reyers is a 64 y.o. male.  No Patient Care Coordination Note on file.       1. Coronary artery disease involving native coronary artery of native heart without angina pectoris  Stable without angina  Recent unremarkable stress test.  No new abnormality    2. Hyperlipidemia LDL goal < 70  On Rx    3. Hypothyroidism, unspecified type  On replacement check TSH clinically euthyroid    4. Renal oncocytoma of right kidney  Followed at [facility]    5. Essential hypertension  Needs improved control  Restart lisinopril albeit at a bit lower dose 10 mg.  Check electrolytes on 50 mg hydrochlorothiazide  - Basic Metabolic Panel  - hydroCHLOROthiazide (HYDRODIURIL) 50 MG tablet; Take 1 tablet (50 mg total) by mouth daily.; Refill: 0  - lisinopril (ZESTRIL) 10 MG tablet; Take 1 tablet (10 mg total) by mouth 2 (two) times a day.  Dispense: 60 tablet; Refill: 11    6. Hypertrophic cardiomyopathy (H)  Recent addition of calcium channel blocker to his regimen this summer.  No arrhythmia.  No heart failure.  Mild obstruction.  Stress testing showed old MI nothing abnormal.  No arrhythmia.          Plan:  Restart lisinopril 10 mg  Report blood pressures to me from home in 10 days    Check BUN/creatinine  Clinic follow-up 3 months-blood pressure      Patient Instructions     MR Cardiac without and with IV Contrast7/10/2019  [facility]  Result Impression     1. Findings consistent with sigmoid subtype hypertrophic cardiomyopathy with a  maximal wall thickness of 21 mm at the basal septum. Mild flow disturbance in  the LV outflow tract but no RACHEAL was identified. There is mitral regurgitation.  2. Small  subendocardial infarct in the lateral wall and minimal scarring in the  inferoseptum at the inferior RV insertion site.  3. Ascending aortic dilatation, 45 mm at the mid ascending aorta.   Result Narrative           Diagnoses and all orders for this visit:    Coronary artery disease involving native coronary artery of native heart without angina pectoris    Hyperlipidemia LDL goal < 70    Hypothyroidism, unspecified type    Renal oncocytoma of right kidney    Essential hypertension  -     Basic Metabolic Panel  -     hydroCHLOROthiazide (HYDRODIURIL) 50 MG tablet; Take 1 tablet (50 mg total) by mouth daily.; Refill: 0  -     lisinopril (ZESTRIL) 10 MG tablet; Take 1 tablet (10 mg total) by mouth 2 (two) times a day.  Dispense: 60 tablet; Refill: 11    Hypertrophic cardiomyopathy (H)    Other orders  -     Influenza, Recombinant, Inj, Quadrivalent, PF, 18+YRS        Medications after visit  Current Outpatient Medications   Medication Sig Dispense Refill   ? aspirin 81 MG EC tablet Take 81 mg by mouth daily.     ? CRESTOR 40 mg tablet Take 40 mg by mouth daily.      ? diltiazem (CARDIZEM CD) 180 MG 24 hr capsule Take 180 mg by mouth.     ? levothyroxine (SYNTHROID, LEVOTHROID) 125 MCG tablet TAKE 1 TABLET BY MOUTH DAILY AT 6 AM 90 tablet 0   ? metoprolol succinate (TOPROL-XL) 25 MG Take 12.5 mg by mouth.     ? hydroCHLOROthiazide (HYDRODIURIL) 50 MG tablet Take 1 tablet (50 mg total) by mouth daily.  0   ? lisinopril (ZESTRIL) 10 MG tablet Take 1 tablet (10 mg total) by mouth 2 (two) times a day. 60 tablet 11     No current facility-administered medications for this visit.            This provider spent greater than 40 min. face-to-face time with the patient and/or his family.  More than half this time was spent in counseling, discussing, and or coordination of care which was consistent with the nature of this patient's problems which are listed and described in the assessment and plan.        Jhonathan Cline,  MD  Internal medicine  [facility]  [phone]  [email or web address]    Much or all of the text in this note was generated through the use of Dragon Dictate voice-to-text software. Errors in spelling or words which seem out of context are unintentional.   Sound alike errors, in particular, may have escaped editing.                 Subjective:   Chief Complaint:  Hypertension (Elevated readings. Change in meds per [place]. Elevated since.) and Flu Vaccine    Recent visit [facility]    He gets an annual flight exam at [facility]  He has ischemic heart disease  Stress testing was mildly abnormal  Showed old deficit  He had cardiac MRI which revealed mild hypertrophic cardiomyopathy which did not seem to be of any hemodynamic significance.  Holter monitor revealed no arrhythmia.    Blood pressure medicine changed with initial discontinuation of lisinopril hydrochlorothiazide in addition of diltiazem beta-blocker    His blood pressure went up on this regimen.  Hydrochlorothiazide was re-added however home blood pressure checks are still high 140s and 150s.  Diastolic is been okay  Hypertension-There are no cardiovascular, respiratory, neurologic complaints.No claudication.There is no orthostasis.Patient is compliant with medications.  Medications reviewed.   No side effects from medication.    Hyperlipoproteinemia-patient is tolerating medication.  There are no myalgia, arthralgia, weakness.  No Bowel issues.  Liver profile has been normal.  Patient has met cholesterol goals.      HYPOTHYROIDISM -on replacement.  No symptoms of hypothyroidism-fatigue, temperature intolerance, dry skin, constipation, weight gain, edema, diplopia.  No symptoms of hyperthyroidism-tremor, weight loss, palpitations, diaphoresis, heat intolerance, diarrhea.  Current dose of thyroid medication noted on  medication list and verified.  Last TSH and T4 reviewed      Review of Systems:     Extensive 10-point  "review of systems was performed. Please see the HPI for problem specific pertinent review of systems.     Patient does note he feels well    Otherwise, the following systems are noncontributory including constitutional, eyes, ears, nose and throat, cardiovascular, respiratory, gastrointestinal, genitourinary, musculoskeletal,neurological, skin and/or breast, endocrine, hematologic/lymph, allergic/immunologic and psychiatric.              Medications:  Current Outpatient Medications on File Prior to Visit   Medication Sig   ? aspirin 81 MG EC tablet Take 81 mg by mouth daily.   ? CRESTOR 40 mg tablet Take 40 mg by mouth daily.    ? diltiazem (CARDIZEM CD) 180 MG 24 hr capsule Take 180 mg by mouth.   ? levothyroxine (SYNTHROID, LEVOTHROID) 125 MCG tablet TAKE 1 TABLET BY MOUTH DAILY AT 6 AM   ? metoprolol succinate (TOPROL-XL) 25 MG Take 12.5 mg by mouth.   ? [DISCONTINUED] hydrochlorothiazide (HYDRODIURIL) 25 MG tablet Take 50 mg by mouth daily.    ? [DISCONTINUED] lisinopril (PRINIVIL,ZESTRIL) 20 MG tablet Take 20 mg by mouth daily.      No current facility-administered medications on file prior to visit.             Allergies:No Known Allergies    PSFHx: Tobacco Status:  He  reports that he quit smoking about 54 years ago. He has never used smokeless tobacco.   Alcohol Status:    Social History     Substance and Sexual Activity   Alcohol Use Not on file       reports that he quit smoking about 54 years ago. He has never used smokeless tobacco. No history on file for alcohol and drug.    Objective:    BP (!) 145/92   Pulse (!) 56   Ht 6' 3.5\" (1.918 m)   Wt (!) 229 lb 0.6 oz (103.9 kg)   SpO2 97%   BMI 28.25 kg/m    Weight:   Wt Readings from Last 3 Encounters:   12/05/19 (!) 229 lb 0.6 oz (103.9 kg)   05/25/18 220 lb 0.6 oz (99.8 kg)   10/30/15 (!) 230 lb 0.6 oz (104.3 kg)     BP Readings from Last 10 Encounters:   12/05/19 (!) 145/92   05/25/18 128/76   10/30/15 126/82         General-appears well, no acute " distress.  Pulses regular throughout  Lungs clear  No murmur  No edema  Good pulses      Review of clinical lab tests  Lab Results   Component Value Date    TSH 2.45 10/30/2015    PSA 2.4 05/25/2018       No results found for: GLU  No results found for this or any previous visit (from the past 24 hour(s)).    RADIOLOGY: No results found.    Review of recent consultation-[facility]- reviewed cardiology notes stress test.

## 2021-07-01 VITALS
BODY MASS INDEX: 28.37 KG/M2 | DIASTOLIC BLOOD PRESSURE: 81 MMHG | TEMPERATURE: 96.9 F | OXYGEN SATURATION: 99 % | WEIGHT: 230 LBS | SYSTOLIC BLOOD PRESSURE: 156 MMHG | HEART RATE: 56 BPM

## 2021-10-17 ENCOUNTER — HEALTH MAINTENANCE LETTER (OUTPATIENT)
Age: 66
End: 2021-10-17

## 2021-10-20 ENCOUNTER — OFFICE VISIT (OUTPATIENT)
Dept: INTERNAL MEDICINE | Facility: CLINIC | Age: 66
End: 2021-10-20
Payer: COMMERCIAL

## 2021-10-20 VITALS
DIASTOLIC BLOOD PRESSURE: 76 MMHG | BODY MASS INDEX: 28.08 KG/M2 | TEMPERATURE: 97.6 F | SYSTOLIC BLOOD PRESSURE: 136 MMHG | WEIGHT: 227.7 LBS | OXYGEN SATURATION: 98 % | HEART RATE: 55 BPM

## 2021-10-20 DIAGNOSIS — Z23 NEED FOR IMMUNIZATION AGAINST INFLUENZA: ICD-10-CM

## 2021-10-20 DIAGNOSIS — E78.5 HYPERLIPIDEMIA WITH TARGET LDL LESS THAN 70: ICD-10-CM

## 2021-10-20 DIAGNOSIS — I10 ESSENTIAL HYPERTENSION, BENIGN: ICD-10-CM

## 2021-10-20 DIAGNOSIS — I42.2 HYPERTROPHIC CARDIOMYOPATHY (H): Primary | ICD-10-CM

## 2021-10-20 DIAGNOSIS — Z23 NEED FOR 23-POLYVALENT PNEUMOCOCCAL POLYSACCHARIDE VACCINE: ICD-10-CM

## 2021-10-20 DIAGNOSIS — Z23 NEED FOR VACCINATION FOR PNEUMOCOCCUS: ICD-10-CM

## 2021-10-20 PROCEDURE — 90662 IIV NO PRSV INCREASED AG IM: CPT | Performed by: INTERNAL MEDICINE

## 2021-10-20 PROCEDURE — G0009 ADMIN PNEUMOCOCCAL VACCINE: HCPCS | Performed by: INTERNAL MEDICINE

## 2021-10-20 PROCEDURE — 99214 OFFICE O/P EST MOD 30 MIN: CPT | Mod: 25 | Performed by: INTERNAL MEDICINE

## 2021-10-20 PROCEDURE — G0008 ADMIN INFLUENZA VIRUS VAC: HCPCS | Performed by: INTERNAL MEDICINE

## 2021-10-20 PROCEDURE — 90732 PPSV23 VACC 2 YRS+ SUBQ/IM: CPT | Performed by: INTERNAL MEDICINE

## 2021-10-20 RX ORDER — METOPROLOL SUCCINATE 25 MG/1
12.5 TABLET, EXTENDED RELEASE ORAL DAILY
COMMUNITY
Start: 2021-04-01 | End: 2024-01-09

## 2021-10-20 NOTE — PATIENT INSTRUCTIONS
65-year-old man, retired  executive, who also gets medical care at [facility] from the [facility] health program and also [facility] cardiology    Hypertrophic cardiomyopathy and coronary artery disease, with history of two-vessel coronary bypass grafting in 2004, not experiencing any cardiac symptoms.  His [facility] cardiologist is Dr. Antione Grijalva.  Cardiac MRI July 2019 reported hypertrophic cardiomyopathy with 21 mm septal thickness.    7- [facility]  1. Sigmoid ventricular septum with basal septal prominence: 20 mm (consistent with known diagnosis of hypertrophic cardiomyopathy).  2. Normal left ventricular chamber size and wall motion. Calculated ejection fraction 64%.  3. No systolic anterior motion of mitral apparatus at rest or with provocation.  4. No dynamic left ventricular outflow tract obstruction (at rest, with Valsalva maneuver, or with [id number] exercise).  5. Normal right ventricular chamber size and systolic function.  6. Estimated right ventricular systolic pressure 27 mmHg (systolic blood pressure 122 mmHg).  7. No hemodynamically significant valvular heart disease.  8. Mild sinus of Valsalva dilatation (diameter 44 mm); upper normal for patient is 43 mm.  9. Mildly enlarged mid ascending aorta diameter (diameter 45 mm at mid level); upper normal for patient is 42 mm.  10. Abdominal aortic aneurysm  3.4 cm in anteroposterior dimension.  11. Compared to the report of 07/08/2019 the following changes have occurred: dynamic outflow  obstruction is no longer present on today's study. No other significant changes noted.     Regarding coronary disease, he had an exercise stress test with nuclear myocardial imaging 6/1/2020, which demonstrated an excellent exercise tolerance, going 12.7 minutes on the Reji protocol, 142% of predicted functional aerobic capacity.  The radionuclide imaging showed no signs of stress-induced ischemia.  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  I told Mr. 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     Hyperlipidemia in the context of coronary disease, on high intensity rosuvastatin with great control of lipids  Lipid panel done at [facility] July 9, 2021 had total cholesterol 135, triglycerides 71, HDL 47, LDL 74.  This is excellent control    Hypertension, currently on combination of lisinopril 10 mg once a day and diltiazem extended release 180 mg once a day and hydrochlorothiazide 50 mg once a day.    I would favor being pretty aggressive with blood pressure control, aiming for consistent 120/80 at rest.  Kidney function has been satisfactory, and has been maintaining his potassium levels even though he is on a pretty hefty dose of hydrochlorothiazide.     Renal oncocytoma, status post resection of 2 lesions in 2014, slight increase in the left-sided mass by CT scan 6/1/2020, continue annual surveillance.      CT ([place]): 7/9/2021  Impression: 1. No definite change in the heterogeneously hyperenhancing mass in the medial aspect of the lower left kidney compared to the most recent study of 06/01/2020, however this has shown slow progressive enlargement over multiple years. 2. Stable fusiform 3.5 cm infrarenal abdominal aortic aneurysm.      Abdominal aortic aneurysm measured 3.5 cm by CT scan July 2021, continue annual surveillance.       Benign prostatic hyperplasia, he struggles a bit empty his bladder completely.  I told him there are medication options to treat BPH to help empty his bladder.  One option might be tamsulosin 0.4 mg a day, although tamsulosin may lower blood pressure.  If he were to start tamsulosin, he may be necessary to back down on one of his other blood pressure  medicines     Personal history of tubular adenoma colon polyp  Colonoscopy April 26, 2021 notable for 2 polyps in the ascending colon, 6 mm, 5 mm.  One polyp ascending colon, 6 mm.  These were tubular adenomas, recheck recommended in 3 years which would be April 2024    Overweight with body mass index of 28.37.  I would like to see him lose about 15 to 20 pounds in 2021.  That will help with blood pressure control, take a load off his heart, help with cholesterol numbers too.  I reminded about the importance of eating slower, controlling portion size, and identifying problem foods to curtail her limited, such as starches, sweets, and fried foods.  I reminded him that alcohol is a source of carbohydrate calories.     Got Moderna Covid vaccine  second shot February 19, 2021.    Awaiting official instruction from CDC with regards to third shot Moderna, probably will be a half dose    Today October 20, 2021 we will administer pneumococcal polysaccharide 23 and seasonal influenza, high-dose    For tetanus and shingles vaccine, he should get those at a community pharmacy, since those are reimbursed under his Medicare prescription drug plan      Immunization History   Administered Date(s) Administered     COVID-19,PF,Moderna 01/24/2021, 02/19/2021     Influenza (H1N1) 01/22/2010     Influenza Quad, Recombinant, pf(RIV4) (Flublok) 12/05/2019     Influenza Vaccine IM > 6 months Valent IIV4 (Alfuria,Fluzone) 10/31/2014     Influenza Vaccine, 6+MO IM (QUADRIVALENT W/PRESERVATIVES) 10/30/2015, 10/20/2020     Pneumo Conj 13-V (2010&after) 10/30/2015

## 2021-10-20 NOTE — PROGRESS NOTES
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  His [facility] cardiologist is Dr. Antione Grijalva.  Cardiac MRI July 2019 reported hypertrophic cardiomyopathy with 21 mm septal thickness.      7- [facility]  1. Sigmoid ventricular septum with basal septal prominence: 20 mm (consistent with known diagnosis of hypertrophic cardiomyopathy).  2. Normal left ventricular chamber size and wall motion. Calculated ejection fraction 64%.  3. No systolic anterior motion of mitral apparatus at rest or with provocation.  4. No dynamic left ventricular outflow tract obstruction (at rest, with Valsalva maneuver, or with [id number]-[id number] exercise).  5. Normal right ventricular chamber size and systolic function.  6. Estimated right ventricular systolic pressure 27 mmHg (systolic blood pressure 122 mmHg).  7. No hemodynamically significant valvular heart disease.  8. Mild sinus of Valsalva dilatation (diameter 44 mm); upper normal for patient is 43 mm.  9. Mildly enlarged mid ascending aorta diameter (diameter 45 mm at mid level); upper normal for patient is 42 mm.  10. Abdominal aortic aneurysm  3.4 cm in anteroposterior dimension.  11. Compared to the report of 07/08/2019 the following changes have occurred: dynamic outflow  obstruction is no longer present on today's study. No other significant changes  noted.     Regarding coronary disease, he had an exercise stress test with nuclear myocardial imaging 6/1/2020, which demonstrated an excellent exercise tolerance, going 12.7 minutes on the Reji protocol, 142% of predicted functional aerobic capacity.  The radionuclide imaging showed no signs of stress-induced ischemia.  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  I told Mr. 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     Hyperlipidemia in the context of coronary disease, on high intensity rosuvastatin with great control of lipids  Lipid panel done at [facility] July 9, 2021 had total cholesterol 135, triglycerides 71, HDL 47, LDL 74.  This is excellent control    Hypertension, currently on combination of lisinopril 10 mg once a day and diltiazem extended release 180 mg once a day and hydrochlorothiazide 50 mg once a day.    I would favor being pretty aggressive with blood pressure control, aiming for consistent 120/80 at rest.  Kidney function has been satisfactory, and has been maintaining his potassium levels even though he is on a pretty hefty dose of hydrochlorothiazide.    Hypothyroidism, on a stable dose of levothyroxine 125 mcg/day.     Renal oncocytoma, status post resection of 2 lesions in 2014, slight increase in the left-sided mass by CT scan 6/1/2020, continue annual surveillance.      CT ([facility]): 7/9/2021  Impression: 1. No definite change in the heterogeneously hyperenhancing mass in the medial aspect of the lower left kidney compared to the most recent study of 06/01/2020, however this has shown slow progressive enlargement over multiple years. 2. Stable fusiform 3.5 cm infrarenal abdominal aortic aneurysm.      Abdominal aortic aneurysm  measured 3.5 cm by CT scan July 2021, continue annual surveillance.       Benign prostatic hyperplasia, he struggles a bit empty his bladder completely.  I told him there are medication options to treat BPH to help empty his bladder.  One option might be tamsulosin 0.4 mg a day, although tamsulosin may lower blood pressure.  If he were to start tamsulosin, he may be necessary to back down on one of his other blood pressure medicines     Personal history of tubular adenoma colon polyp  Colonoscopy April 26, 2021 notable for 2 polyps in the ascending colon, 6 mm, 5 mm.  One polyp ascending colon, 6 mm.  These were tubular adenomas, recheck recommended in 3 years which would be April 2024    Overweight with body mass index of 28.37.  I would like to see him lose about 15 to 20 pounds in 2021.  That will help with blood pressure control, take a load off his heart, help with cholesterol numbers too.  I reminded about the importance of eating slower, controlling portion size, and identifying problem foods to curtail her limited, such as starches, sweets, and fried foods.  I reminded him that alcohol is a source of carbohydrate calories.     Got Moderna Covid vaccine  second shot February 19, 2021.    Awaiting official instruction from CDC with regards to third shot Moderna, probably will be a half dose    Today October 20, 2021 we will administer pneumococcal polysaccharide 23 and seasonal influenza, high-dose    For tetanus and shingles vaccine, he should get those at a community pharmacy, since those are reimbursed under his Medicare prescription drug plan      Immunization History   Administered Date(s) Administered     COVID-19,PF,Moderna 01/24/2021, 02/19/2021     Influenza (H1N1) 01/22/2010     Influenza Quad, Recombinant, pf(RIV4) (Flublok) 12/05/2019     Influenza Vaccine IM > 6 months Valent IIV4 (Alfuria,Fluzone) 10/31/2014     Influenza Vaccine, 6+MO IM (QUADRIVALENT W/PRESERVATIVES) 10/30/2015, 10/20/2020      Pneumo Conj 13-V (2010&after) 10/30/2015       --------------------------------------------------------------------------------------------------------------------------  History of Present Illness  This 65 year old old     Follow-up multiple issues, administration of immunizations    65-year-old man, 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  His [facility] cardiologist is Dr. Antione Grijalva.  Cardiac MRI July 2019 reported hypertrophic cardiomyopathy with 21 mm septal thickness.      7- [place] [facility]  1. Sigmoid ventricular septum with basal septal prominence: 20 mm (consistent with known diagnosis of hypertrophic cardiomyopathy).  2. Normal left ventricular chamber size and wall motion. Calculated ejection fraction 64%.  3. No systolic anterior motion of mitral apparatus at rest or with provocation.  4. No dynamic left ventricular outflow tract obstruction (at rest, with Valsalva maneuver, or with [id number]-bg-[id number] exercise).  5. Normal right ventricular chamber size and systolic function.  6. Estimated right ventricular systolic pressure 27 mmHg (systolic blood pressure 122 mmHg).  7. No hemodynamically significant valvular heart disease.  8. Mild sinus of Valsalva dilatation (diameter 44 mm); upper normal for patient is 43 mm.  9. Mildly enlarged mid ascending aorta diameter (diameter 45 mm at mid level); upper normal for patient is 42 mm.  10. Abdominal aortic aneurysm  3.4 cm in anteroposterior dimension.  11. Compared to the report of 07/08/2019 the following changes have occurred: dynamic outflow  obstruction is no longer present on today's study. No other significant changes noted.     Regarding coronary disease, he had an exercise stress test with nuclear  myocardial imaging 6/1/2020, which demonstrated an excellent exercise tolerance, going 12.7 minutes on the Reji protocol, 142% of predicted functional aerobic capacity.  The radionuclide imaging showed no signs of stress-induced ischemia.  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  I told Mr. 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      Wt Readings from Last 3 Encounters:   10/20/21 103.3 kg (227 lb 11.2 oz)   02/10/21 104.3 kg (230 lb)   12/05/19 103.9 kg (229 lb 0.6 oz)     BP Readings from Last 3 Encounters:   10/20/21 136/76   02/10/21 (!) 156/81   12/05/19 (!) 145/92       Lab Results   Component Value Date     12/05/2019    BUN 13 12/05/2019    CO2 31 12/05/2019    TSH 0.35 02/10/2021    PSA 2.4 05/25/2018        ---------------------------------------------------------------------------------------------------------------------------    Medications, Allergies, Social, and Problem List   Current Outpatient Medications   Medication Sig Dispense Refill     aspirin 81 MG EC tablet [ASPIRIN 81 MG EC TABLET] Take 81 mg by mouth daily.       CRESTOR 40 mg tablet [CRESTOR 40 MG TABLET] Take 40 mg by mouth daily.        hydroCHLOROthiazide (HYDRODIURIL) 50 MG tablet [HYDROCHLOROTHIAZIDE (HYDRODIURIL) 50 MG TABLET] Take 1 tablet (50 mg total) by mouth daily.  0     levothyroxine (SYNTHROID, LEVOTHROID) 125 MCG tablet [LEVOTHYROXINE (SYNTHROID, LEVOTHROID) 125 MCG TABLET] TAKE 1 TABLET BY MOUTH DAILY AT 6 AM 90 tablet 3     metoprolol succinate ER (TOPROL-XL) 25 MG 24 hr tablet Take 12.5 mg by mouth daily       diltiazem (CARDIZEM CD) 180 MG 24 hr capsule [DILTIAZEM (CARDIZEM CD) 180 MG 24 HR CAPSULE] Take 180 mg by mouth.       lisinopriL  (ZESTRIL) 10 MG tablet [LISINOPRIL (ZESTRIL) 10 MG TABLET] Take 1 tablet (10 mg total) by mouth 2 (two) times a day. (Patient taking differently: Take 10 mg by mouth daily ) 180 tablet 11     nitroglycerin (NITROSTAT) 0.4 MG SL tablet [NITROGLYCERIN (NITROSTAT) 0.4 MG SL TABLET] Place 1 tablet (0.4 mg total) under the tongue every 5 (five) minutes as needed for chest pain. (Patient not taking: Reported on 10/20/2021) 30 tablet 3     No Known Allergies  Social History     Tobacco Use     Smoking status: Former Smoker     Quit date: 10/30/1965     Years since quittin.0     Smokeless tobacco: Never Used   Substance Use Topics     Alcohol use: None     Drug use: None     Patient Active Problem List   Diagnosis     CAD (coronary artery disease)     Renal oncocytoma of right kidney     Hyperlipidemia LDL goal < 70     Hypothyroidism     Abdominal aortic aneurysm (AAA) (H)     Hypertrophic cardiomyopathy (H)     Essential hypertension, benign     Benign prostatic hyperplasia with weak urinary stream     Personal history of colonic polyps     Overweight (BMI 25.0-29.9)        Reviewed, reconciled and updated       Physical Exam   General Appearance: Appears well, blood pressure satisfactory    /76 (BP Location: Right arm, Patient Position: Sitting, Cuff Size: Adult Large)   Pulse 55   Temp 97.6  F (36.4  C) (Oral)   Wt 103.3 kg (227 lb 11.2 oz)   SpO2 98%   BMI 28.08 kg/m         Additional Information   I spent 30 minutes on this encounter, including reviewing interval history since last visit, examining the patient, explaining and counseling the issues enumerated in the Assessment and Plan (patient given a copy)       NELI ADAMS MD, MD

## 2022-02-15 ENCOUNTER — MYC MEDICAL ADVICE (OUTPATIENT)
Dept: INTERNAL MEDICINE | Facility: CLINIC | Age: 67
End: 2022-02-15
Payer: COMMERCIAL

## 2022-02-15 DIAGNOSIS — E78.5 HYPERLIPIDEMIA WITH TARGET LDL LESS THAN 70: Primary | ICD-10-CM

## 2022-02-15 DIAGNOSIS — E03.9 HYPOTHYROIDISM, UNSPECIFIED TYPE: ICD-10-CM

## 2022-02-15 DIAGNOSIS — I10 ESSENTIAL HYPERTENSION: ICD-10-CM

## 2022-02-16 RX ORDER — ROSUVASTATIN CALCIUM 40 MG/1
40 TABLET, COATED ORAL DAILY
Qty: 90 TABLET | Refills: 3 | Status: SHIPPED | OUTPATIENT
Start: 2022-02-16 | End: 2023-04-28

## 2022-02-16 RX ORDER — DILTIAZEM HYDROCHLORIDE 180 MG/1
180 CAPSULE, COATED, EXTENDED RELEASE ORAL DAILY
Qty: 90 CAPSULE | Refills: 3 | OUTPATIENT
Start: 2022-02-16 | End: 2024-08-08

## 2022-02-16 RX ORDER — METOPROLOL SUCCINATE 25 MG/1
25 TABLET, EXTENDED RELEASE ORAL DAILY
Qty: 90 TABLET | Refills: 3 | OUTPATIENT
Start: 2022-02-16 | End: 2024-08-08

## 2022-02-16 RX ORDER — HYDROCHLOROTHIAZIDE 50 MG/1
50 TABLET ORAL DAILY
Qty: 90 TABLET | Refills: 3 | Status: CANCELLED | OUTPATIENT
Start: 2022-02-16

## 2022-02-16 RX ORDER — LISINOPRIL 10 MG/1
10 TABLET ORAL DAILY
Qty: 90 TABLET | Refills: 3 | Status: CANCELLED | OUTPATIENT
Start: 2022-02-16

## 2022-02-16 RX ORDER — LEVOTHYROXINE SODIUM 125 UG/1
TABLET ORAL
Qty: 90 TABLET | Refills: 3 | OUTPATIENT
Start: 2022-02-16 | End: 2024-08-08

## 2022-02-16 NOTE — TELEPHONE ENCOUNTER
Per daughter updated medication list there are some differences in the sigs we have on file. I set up Rx's per daughters list, please approve Rx's in this encounter if correct    Metoprolol   25mg  1/day bedtime  (On file 12.5 daily)  Crestor (rosuvastatin calcium) 40 mg 1/day bedtime  Lisinopril 10mg 1x/day (confirmed with OV note)  DilTIAZem CD 180mg 1x/day  HydroCHLOROthiazide 50 mg  1x/day  Levothyroxine. 125 mcg. 1/day  Aspirin 80 mg.  1/day    OV Note 10/20/21  Hypertension, currently on combination of lisinopril 10 mg once a day and diltiazem extended release 180 mg once a day and hydrochlorothiazide 50 mg once a day.    I would favor being pretty aggressive with blood pressure control, aiming for consistent 120/80 at rest.  Kidney function has been satisfactory, and has been maintaining his potassium levels even though he is on a pretty hefty dose of hydrochlorothiazide.

## 2022-03-01 ENCOUNTER — TRANSFERRED RECORDS (OUTPATIENT)
Dept: HEALTH INFORMATION MANAGEMENT | Facility: CLINIC | Age: 67
End: 2022-03-01
Payer: COMMERCIAL

## 2022-06-02 ENCOUNTER — NURSE TRIAGE (OUTPATIENT)
Dept: NURSING | Facility: CLINIC | Age: 67
End: 2022-06-02
Payer: COMMERCIAL

## 2022-06-02 DIAGNOSIS — E03.9 HYPOTHYROIDISM, UNSPECIFIED TYPE: ICD-10-CM

## 2022-06-02 RX ORDER — LEVOTHYROXINE SODIUM 125 UG/1
TABLET ORAL
Qty: 90 TABLET | Refills: 1 | Status: SHIPPED | OUTPATIENT
Start: 2022-06-02 | End: 2022-06-08

## 2022-06-02 NOTE — TELEPHONE ENCOUNTER
"Normal TSH lab levels shown on 7/9/2021 (from external [facility] lab).    Last Written Prescription Date:  3/31/2021  Last Fill Quantity: 90,  # refills: 3   Last office visit provider:  10/20/2021     Requested Prescriptions   Pending Prescriptions Disp Refills     levothyroxine (SYNTHROID/LEVOTHROID) 125 MCG tablet 90 tablet 3     Sig: [LEVOTHYROXINE (SYNTHROID, LEVOTHROID) 125 MCG TABLET] TAKE 1 TABLET BY MOUTH DAILY AT 6 AM       Thyroid Protocol Failed - 6/2/2022  2:41 PM        Failed - Normal TSH on file in past 12 months     Recent Labs   Lab Test 02/10/21  1833   TSH 0.35              Passed - Patient is 12 years or older        Passed - Recent (12 mo) or future (30 days) visit within the authorizing provider's specialty     Patient has had an office visit with the authorizing provider or a provider within the authorizing providers department within the previous 12 mos or has a future within next 30 days. See \"Patient Info\" tab in inbasket, or \"Choose Columns\" in Meds & Orders section of the refill encounter.              Passed - Medication is active on med list             Pebbles Lockwood RN 06/02/22 2:41 PM  "

## 2022-06-02 NOTE — TELEPHONE ENCOUNTER
"Pt calls for refill request -> levothyroxine.  See separate refill encounter created and processed.  Was told by his mailorder [employer] rep that their request has been made, however chart notes do not indicate any refill request received from [employer].    Pt reports five days of levothyroxine tablets remaining.  Med refill encounter processed per RN refill protocol.    [facility] Nurse Advisor     Additional Information    Caller requesting a refill, no triage required, and triager able to refill per department policy    Protocols used: MEDICATION QUESTION CALL-A-OH      ___________________________      COVID 19 Nurse Triage Plan/Patient Instructions    Please be aware that novel coronavirus (COVID-19) may be circulating in the community. If you develop symptoms such as fever, cough, or SOB or if you have concerns about the presence of another infection including coronavirus (COVID-19), please contact your health care provider or visit [email or web address].     Disposition/Instructions    Additional COVID19 information to add for patients.   How can I protect others?  If you have symptoms (fever, cough, body aches or trouble breathing): Stay home and away from others (self-isolate) until:    At least 10 days have passed since your symptoms started, And     You ve had no fever--and no medicine that reduces fever--for 1 full day (24 hours), And      Your other symptoms have resolved (gotten better).     If you don t have symptoms, but a test showed that you have COVID-19 (you tested positive):    Stay home and away from others (self-isolate). Follow the tips under \"How do I self-isolate?\" below for 10 days (20 days if you have a weak immune system).    You don't need to be retested for COVID-19 before going back to school or work. As long as you're fever-free and feeling better, you can go back to school, work and other activities after waiting the 10 or 20 days.     How do I " self-isolate?    Stay in your own room, even for meals. Use your own bathroom if you can.     Stay away from others in your home. No hugging, kissing or shaking hands. No visitors.    Don t go to work, school or anywhere else.     Clean  high touch  surfaces often (doorknobs, counters, handles, etc.). Use a household cleaning spray or wipes. You ll find a full list on the EPA website:  www.epa.gov/pesticide-registration/list-n-disinfectants-use-against-sars-cov-2.    Cover your mouth and nose with a mask, tissue or washcloth to avoid spreading germs.    Wash your hands and face often. Use soap and water.    Caregivers in these groups are at risk for severe illness due to COVID-19:  o People 65 years and older  o People who live in a nursing home or long-term care facility  o People with chronic disease (lung, heart, cancer, diabetes, kidney, liver, immunologic)  o People who have a weakened immune system, including those who:  - Are in cancer treatment  - Take medicine that weakens the immune system, such as corticosteroids  - Had a bone marrow or organ transplant  - Have an immune deficiency  - Have poorly controlled HIV or AIDS  - Are obese (body mass index of 40 or higher)  - Smoke regularly    Caregivers should wear gloves while washing dishes, handling laundry and cleaning bedrooms and bathrooms.    Use caution when washing and drying laundry: Don t shake dirty laundry, and use the warmest water setting that you can.    For more tips, go to www.cdc.gov/coronavirus/2019-ncov/downloads/10Things.pdf.    How can I take care of myself?  1. Get lots of rest. Drink extra fluids (unless a doctor has told you not to).     2. Take Tylenol (acetaminophen) for fever or pain. If you have liver or kidney problems, ask your family doctor if it s okay to take Tylenol.     Adults can take either:     650 mg (two 325 mg pills) every 4 to 6 hours, or     1,000 mg (two 500 mg pills) every 8 hours as needed.     Note: Don t take  more than 3,000 mg in one day.   Acetaminophen is found in many medicines (both prescribed and over-the-counter medicines). Read all labels to be sure you don t take too much.     For children, check the Tylenol bottle for the right dose. The dose is based on the child s age or weight.    3. If you have other health problems (like cancer, heart failure, an organ transplant or severe kidney disease): Call your specialty clinic if you don t feel better in the next 2 days.    4. Know when to call 911: Emergency warning signs include:    Trouble breathing or shortness of breath    Pain or pressure in the chest that doesn t go away    Feeling confused like you haven t felt before, or not being able to wake up    Bluish-colored lips or face    What are the symptoms of COVID-19?     The most common symptoms are cough, fever and trouble breathing.     Less common symptoms include body aches, chills, diarrhea (loose, watery poops), fatigue (feeling very tired), headache, runny nose, sore throat and loss of smell.    COVID-19 can cause severe coughing (bronchitis) and lung infection (pneumonia).    How does it spread?     The virus may spread when a person coughs or sneezes into the air. The virus can travel about 6 feet this way, and it can live on surfaces.      Common  (household disinfectants) will kill the virus.    Who is at risk?  Anyone can catch COVID-19 if they re around someone who has the virus.    How can others protect themselves?     Stay away from people who have COVID-19 (or symptoms of COVID-19).    Wash hands often with soap and water. Or, use hand  with at least 60% alcohol.    Avoid touching the eyes, nose or mouth.     Wear a face mask when you go out in public, when sick or when caring for a sick person.    Where can I get more information?     [facility]: About COVID-19: [email or web address]    CDC: What to Do If You re Sick:  www.cdc.gov/coronavirus/2019-ncov/about/steps-when-sick.html    CDC: Ending Home Isolation: www.cdc.gov/coronavirus/2019-ncov/hcp/disposition-in-home-patients.html     CDC: Caring for Someone: www.cdc.gov/coronavirus/2019-ncov/if-you-are-sick/care-for-someone.html     [facility]: Interim Guidance for Hospital Discharge to Home: www.[facility]/diseases/coronavirus/hcp/hospdischarge.pdf    [facility] clinical trials (COVID-19 research studies): [email or web address].[email or web address]-clinical-trials     Below are the COVID-19 hotlines at the [place] Department of [facility] ([facility]). Interpreters are available.   o For health questions: Call 783-842-6012 or 1-170.820.3776 (7 a.m. to 7 p.m.)  o For questions about schools and childcare: Call 351-755-3824 or 1-434.746.8228 (7 a.m. to 7 p.m.)          Thank you for taking steps to prevent the spread of this virus.  o Limit your contact with others.  o Wear a simple mask to cover your cough.  o Wash your hands well and often.    [facility]: About COVID-19: [email or web address]    CDC: What to Do If You're Sick: www.cdc.gov/coronavirus/2019-ncov/about/steps-when-sick.html    CDC: Ending Home Isolation: www.cdc.gov/coronavirus/2019-ncov/hcp/disposition-in-home-patients.html     CDC: Caring for Someone: www.cdc.gov/coronavirus/2019-ncov/if-you-are-sick/care-for-someone.html     [facility]: Interim Guidance for Hospital Discharge to Home: www.[facility]/diseases/coronavirus/hcp/hospdischarge.pdf    [facility] clinical trials (COVID-19 research studies): [email or web address]-clinical-trials     Below are the COVID-19 hotlines at the [place] Department [facility] ([facility]). Interpreters are available.   o For health questions: Call 064-830-6045 or 1-648.761.3507 (7 a.m. to 7 p.m.)  o For questions about schools and childcare: Call 927-220-6372 or 1-171.248.6015 (7 a.m. to 7 p.m.)

## 2022-06-02 NOTE — TELEPHONE ENCOUNTER
Pt calls for refill request -> levothyroxine.  Was told by his mailorder [employer] rep that their request has been made, however chart notes do not indicate any refill request received from [employer].    Pt reports five days of levothyroxine tablets remaining.  Please transmit refill to [employer] [facility].    Thank you-    Jo [facility]NA [facility] Nurse Advisor

## 2022-06-07 ENCOUNTER — TELEPHONE (OUTPATIENT)
Dept: INTERNAL MEDICINE | Facility: CLINIC | Age: 67
End: 2022-06-07
Payer: COMMERCIAL

## 2022-06-07 DIAGNOSIS — E03.9 HYPOTHYROIDISM, UNSPECIFIED TYPE: ICD-10-CM

## 2022-06-07 NOTE — TELEPHONE ENCOUNTER
Reason for Call:  Medication or medication refill:    Do you use a [facility]?  Name of the pharmacy and phone number for the current request:  [facility]/pharmacy #[id number] - [place], [place] - [address] AT [address] & [address]  [phone]    Name of the medication requested: levothyroxine (SYNTHROID/LEVOTHROID) 125 MCG tablet    Other request: Patient is leaving town on the 10th and his mail order is not going to arrive until June 11th. Patient is leaving out of town Friday and asking if he could get a 2 week fill of levothyroxine for 2 weeks and sent to pharmacy above. Please advise.    Can we leave a detailed message on this number? Not Applicable    Phone number patient can be reached at: Home number on file [phone] (home)    Best Time: any    Call taken on 6/7/2022 at 3:55 PM by Wilmer Farris

## 2022-06-08 RX ORDER — LEVOTHYROXINE SODIUM 125 UG/1
TABLET ORAL
Qty: 28 TABLET | Refills: 0 | Status: SHIPPED | OUTPATIENT
Start: 2022-06-08 | End: 2022-06-20

## 2022-06-20 DIAGNOSIS — E03.9 HYPOTHYROIDISM, UNSPECIFIED TYPE: ICD-10-CM

## 2022-06-20 RX ORDER — LEVOTHYROXINE SODIUM 125 UG/1
TABLET ORAL
Qty: 90 TABLET | Refills: 0 | Status: SHIPPED | OUTPATIENT
Start: 2022-06-20 | End: 2022-07-26

## 2022-06-20 NOTE — TELEPHONE ENCOUNTER
Incoming fax from [facility]:    Patient is requesting a 90 day supply for Levothyroxine.     Refill Request  Medication name: Pending Prescriptions:                       Disp   Refills    levothyroxine (SYNTHROID/LEVOTHROID) 125 *28 tab*0            Sig: [LEVOTHYROXINE (SYNTHROID, LEVOTHROID) 125 MCG           TABLET] TAKE 1 TABLET BY MOUTH DAILY AT 6 AM    Who prescribed the medication: Dr. Early   Last refill on medication: 6/8/22  Requested Pharmacy: [facility]  Last appointment with PCP: 10/20/21  Next appointment: Appointment scheduled for 7/26/22

## 2022-07-23 ENCOUNTER — HEALTH MAINTENANCE LETTER (OUTPATIENT)
Age: 67
End: 2022-07-23

## 2022-07-23 ASSESSMENT — ENCOUNTER SYMPTOMS
NERVOUS/ANXIOUS: 0
CHILLS: 0
HEARTBURN: 0
DIZZINESS: 0
SORE THROAT: 0
MYALGIAS: 0
DIARRHEA: 0
PALPITATIONS: 0
JOINT SWELLING: 0
COUGH: 0
ARTHRALGIAS: 0
WEAKNESS: 0
FREQUENCY: 0
EYE PAIN: 0
HEADACHES: 0
PARESTHESIAS: 0
CONSTIPATION: 0
HEMATOCHEZIA: 0
HEMATURIA: 0
DYSURIA: 0
SHORTNESS OF BREATH: 0
FEVER: 0
ABDOMINAL PAIN: 0
NAUSEA: 0

## 2022-07-23 ASSESSMENT — ACTIVITIES OF DAILY LIVING (ADL): CURRENT_FUNCTION: NO ASSISTANCE NEEDED

## 2022-07-26 ENCOUNTER — OFFICE VISIT (OUTPATIENT)
Dept: INTERNAL MEDICINE | Facility: CLINIC | Age: 67
End: 2022-07-26
Payer: COMMERCIAL

## 2022-07-26 VITALS
HEART RATE: 53 BPM | OXYGEN SATURATION: 97 % | SYSTOLIC BLOOD PRESSURE: 112 MMHG | BODY MASS INDEX: 26.98 KG/M2 | DIASTOLIC BLOOD PRESSURE: 62 MMHG | WEIGHT: 217 LBS | TEMPERATURE: 97.8 F | HEIGHT: 75 IN

## 2022-07-26 DIAGNOSIS — D30.01 RENAL ONCOCYTOMA OF RIGHT KIDNEY: ICD-10-CM

## 2022-07-26 DIAGNOSIS — Z00.00 ROUTINE GENERAL MEDICAL EXAMINATION AT A HEALTH CARE FACILITY: Primary | ICD-10-CM

## 2022-07-26 DIAGNOSIS — E03.9 HYPOTHYROIDISM, UNSPECIFIED TYPE: ICD-10-CM

## 2022-07-26 DIAGNOSIS — I25.10 CORONARY ARTERY DISEASE INVOLVING NATIVE CORONARY ARTERY OF NATIVE HEART WITHOUT ANGINA PECTORIS: ICD-10-CM

## 2022-07-26 DIAGNOSIS — I42.2 HYPERTROPHIC CARDIOMYOPATHY (H): ICD-10-CM

## 2022-07-26 DIAGNOSIS — I10 ESSENTIAL HYPERTENSION: ICD-10-CM

## 2022-07-26 PROCEDURE — 99214 OFFICE O/P EST MOD 30 MIN: CPT | Mod: 25 | Performed by: INTERNAL MEDICINE

## 2022-07-26 PROCEDURE — G0438 PPPS, INITIAL VISIT: HCPCS | Performed by: INTERNAL MEDICINE

## 2022-07-26 RX ORDER — HYDROCHLOROTHIAZIDE 25 MG/1
25 TABLET ORAL DAILY
Qty: 90 TABLET | Refills: 3 | Status: SHIPPED | OUTPATIENT
Start: 2022-07-26 | End: 2022-10-06

## 2022-07-26 RX ORDER — LEVOTHYROXINE SODIUM 125 UG/1
TABLET ORAL
Qty: 90 TABLET | Refills: 0 | Status: SHIPPED | OUTPATIENT
Start: 2022-07-26 | End: 2023-04-04

## 2022-07-26 RX ORDER — LISINOPRIL 10 MG/1
10 TABLET ORAL DAILY
Qty: 90 TABLET | Refills: 3 | Status: SHIPPED | OUTPATIENT
Start: 2022-07-26 | End: 2023-06-30

## 2022-07-26 ASSESSMENT — ACTIVITIES OF DAILY LIVING (ADL): CURRENT_FUNCTION: NO ASSISTANCE NEEDED

## 2022-07-26 ASSESSMENT — ENCOUNTER SYMPTOMS
DIARRHEA: 0
PARESTHESIAS: 0
MYALGIAS: 0
CHILLS: 0
PALPITATIONS: 0
CONSTIPATION: 0
HEADACHES: 0
SHORTNESS OF BREATH: 0
JOINT SWELLING: 0
DIZZINESS: 0
DYSURIA: 0
COUGH: 0
SORE THROAT: 0
FEVER: 0
NAUSEA: 0
HEMATURIA: 0
NERVOUS/ANXIOUS: 0
WEAKNESS: 0
HEARTBURN: 0
ARTHRALGIAS: 0
EYE PAIN: 0
FREQUENCY: 0
HEMATOCHEZIA: 0
ABDOMINAL PAIN: 0

## 2022-07-26 NOTE — PATIENT INSTRUCTIONS
Annual welless visit    Mr. Reyers is feeling really good he states.  He just went through a round of executive health exam and recertification of his medical certificate as a , done at [facility] July 15, 2022.  That included a cardiac stress test, echocardiogram, CT scan of his abdomen and aortic ultrasound.    There has been slight enlargement of his renal oncocytoma, and he plans to pursue a biopsy at [place], and that may determine his imaging frequency going forward.    Today July 26, 2022, decided to reduce his hydrochlorothiazide a bit, dropping from 50 down to 25 mg a day.  Blood pressure control is been excellent, and may be he needs less blood pressure medication, particularly because he is lost some weight.    No lab work needed today.  I reviewed his lab work from [place] July 15, which included lipid panel, TSH, PSA    I suggested that he get the updated COVID-19 vaccine once it is available this autumn, formulated against the variants    66-year-old man, retired  executive, who also gets medical care at [facility] from the [employer] health program and also [facility] cardiology and urology    Hypertrophic cardiomyopathy and coronary artery disease, with history of two-vessel coronary bypass grafting in 2004, not experiencing any cardiac symptoms.  His [facility] cardiologist is Dr. Antione Grijalva. Cardiac MRI July 2019 reported hypertrophic cardiomyopathy with 21 mm septal thickness.      [place] exercise stress test performed on 07/18/2022 was negative for ischemia. The  was able to exercise for 9.3 minutes, which is 106.4% of predicted. He achieved 106% of his predicted maximum heart rate. The panel was favorable with a total cholesterol of 118 mg/dL.    7- [place] Echo  Final Impressions   1. Sigmoid ventricular septum with basal septal prominence: 20 mm (consistent with known diagnosis of hypertrophic cardiomyopathy).   2. Normal left ventricular chamber size.   3. Calculated  2-D biplane volumetric left ventricular ejection fraction 61%.   4. No dynamic left ventricular outflow tract obstruction (at rest, with Valsalva maneuver).   5. Left ventricular outflow tract peak velocity with squat to stand 3.7 m/sec (55 mmHg).. Two dimensional imaging post  squat to stand was not adequate for visualization of the mitral valve, there was a slight increase in the degree of mitral   regurgitation post provocative maneuvers.   6. No systolic anterior motion of mitral apparatus at rest.   7. Mild mitral valve regurgitation.   8. Normal right ventricular chamber size.   9. Normal right ventricular systolic function.   10. Estimated right ventricular systolic pressure 41 mmHg (systolic blood pressure 143 mmHg).   11. Mildly enlarged proximal ascending aorta diameter of 42 mm.     Regarding coronary disease, he had an exercise stress test with nuclear myocardial imaging 6/1/2020, which demonstrated an excellent exercise tolerance, going 12.7 minutes on the Reji protocol, 142% of predicted functional aerobic capacity.  The radionuclide imaging showed no signs of stress-induced ischemia.  This was his fourth nuclear cardiac stress test    Hyperlipidemia in the context of coronary disease, on high intensity rosuvastatin with great control of lipids  Lipid panel done at [facility] July 15, 2022 had total cholesterol of 118, triglyceride 67, LDL 51, HDL 53    Hypertension, currently on combination of   lisinopril 10 mg once a day  diltiazem extended release 180 mg once a day  small dose of metoprolol succinate 12.5 mg a day, and   hydrochlorothiazide for which I would reduce his dose from 50 down to 25 mg a day    Mr. Reyers told me that his blood pressure will symptoms run low, particular after exercise, dropping to around 100 systolic.  I think we should back down on the hydrochlorothiazide     Hypothyroidism, on a stable dose of levothyroxine 125 mcg/day.  7-   TSH 0.9    Renal oncocytoma, status  post resection of 2 lesions in 2014, slight increase in the left-sided mass by CT scan 7-  He is working with [facility] urology, and likely will be pursuing a biopsy sometime this autumn 2022, which then may guide how frequently to do the surveillance.    7- [place]  1. Slight increase in size of the heterogeneously enhancing mass in the medial aspect of the lower   pole of the left kidney. The mass now measures 3.0 cm.   2. Indeterminate 6 mm hypoattenuating lesion in the posterior aspect of the right kidney which has   increased minimally since 07/08/2019 when it measured 4 mm. This lesion was not visible on CT   examinations prior to 2019.   3. Stable 3.5 cm fusiform infrarenal abdominal aortic aneurysm.    Abdominal aortic aneurysm measured 3.5 cm by CT scan July 2021, continue annual surveillance.   7- ultrasound [place]  1. Stable 3.4 x 3.4 cm infrarenal abdominal aortic aneurysm.   2. 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    Benign prostatic hyperplasia, he struggles a bit empty his bladder completely.  I told him there are medication options to treat BPH to help empty his bladder.  One option might be tamsulosin 0.4 mg a day, although tamsulosin may lower blood pressure.  If he were to start tamsulosin, he may be necessary to back down on one of his other blood pressure medicines  PSA [facility] July 15, 2022  2.1    Personal history of tubular adenoma colon polyp  Colonoscopy April 26, 2021 notable for 2 polyps in the ascending colon, 6 mm, 5 mm.  One polyp ascending colon, 6 mm.  These were tubular adenomas, recheck recommended in 3 years which would be April 2024    Improved weight  Wt Readings from Last 5 Encounters:   07/26/22 98.4 kg (217 lb)   10/20/21 103.3 kg (227 lb 11.2 oz)   02/10/21 104.3 kg (230 lb)   12/05/19 103.9 kg (229 lb 0.6 oz)   05/25/18 99.8 kg (220 lb 0.6 oz)     Got Moderna Covid vaccine third  shot February 19, 2021, he plans to wait for the updated booster later this autumn, formulated against the variants  For tetanus and shingles vaccine, he should get those at a community pharmacy, since those are reimbursed under his Medicare prescription drug plan    Immunization History   Administered Date(s) Administered    COVID-19,PF,Moderna 01/24/2021, 02/19/2021, 10/23/2021    Influenza (H1N1) 01/22/2010    Influenza Quad, Recombinant, pf(RIV4) (Flublok) 12/05/2019    Influenza Vaccine IM > 6 months Valent IIV4 (Alfuria,Fluzone) 10/31/2014    Influenza Vaccine, 6+MO IM (QUADRIVALENT W/PRESERVATIVES) 10/30/2015, 10/20/2020    Influenza, Quad, High Dose, Pf, 65yr+ (Fluzone HD) 10/20/2021    Pneumo Conj 13-V (2010&after) 10/30/2015    Pneumococcal 23 valent 10/20/2021

## 2022-07-26 NOTE — PROGRESS NOTES
"SUBJECTIVE:   Jan Reyers is a 66 year old male who presents for Preventive Visit.      Patient has been advised of split billing requirements and indicates understanding: Yes  Are you in the first 12 months of your Medicare coverage?  No    Healthy Habits:     In general, how would you rate your overall health?  Excellent    Frequency of exercise:  4-5 days/week    Duration of exercise:  30-45 minutes    Do you usually eat at least 4 servings of fruit and vegetables a day, include whole grains    & fiber and avoid regularly eating high fat or \"junk\" foods?  Yes    Taking medications regularly:  Yes    Medication side effects:  None    Ability to successfully perform activities of daily living:  No assistance needed    Home Safety:  No safety concerns identified    Hearing Impairment:  No hearing concerns    In the past 6 months, have you been bothered by leaking of urine?  No    In general, how would you rate your overall mental or emotional health?  Excellent      PHQ-2 Total Score: 0    Additional concerns today:  Yes    Do you feel safe in your environment? Yes    Have you ever done Advance Care Planning? (For example, a Health Directive, POLST, or a discussion with a medical provider or your loved ones about your wishes): Yes, advance care planning is on file.       Fall risk  Fallen 2 or more times in the past year?: No  Any fall with injury in the past year?: No    Cognitive Screening   1) Repeat 3 items (Leader, Season, Table)    2) Clock draw: NORMAL  3) 3 item recall: Recalls 3 objects  Results: 3 items recalled: COGNITIVE IMPAIRMENT LESS LIKELY    Mini-CogTM Copyright CARISSA Rai. Licensed by the author for use in [facility] [place]; reprinted with permission (ashley@.[facility]). All rights reserved.      Do you have sleep apnea, excessive snoring or daytime drowsiness?: no    Reviewed and updated as needed this visit by clinical staff   Tobacco  Allergies  Meds                Reviewed and updated as " needed this visit by Provider     Meds               Social History     Tobacco Use     Smoking status: Former Smoker     Quit date: 10/30/1965     Years since quittin.7     Smokeless tobacco: Never Used   Substance Use Topics     Alcohol use: Not on file     If you drink alcohol do you typically have >3 drinks per day or >7 drinks per week? No    Alcohol Use 2022   Prescreen: >3 drinks/day or >7 drinks/week? -   Prescreen: >3 drinks/day or >7 drinks/week? No     Current providers sharing in care for this patient include:   Patient Care Team:  Christopher Early MD as PCP - General (Internal Medicine)  Christopher Early MD as Assigned PCP    The following health maintenance items are reviewed in Epic and correct as of today:  Health Maintenance Due   Topic Date Due     ANNUAL REVIEW OF  ORDERS  Never done     HEPATITIS C SCREENING  Never done     DTAP/TDAP/TD IMMUNIZATION (1 - Tdap) Never done     LIPID  Never done     ZOSTER IMMUNIZATION (1 of 2) Never done     AORTIC ANEURYSM SCREENING (SYSTEM ASSIGNED)  2020     COVID-19 Vaccine (4 - Booster for Moderna series) 2022         Review of Systems   Constitutional: Negative for chills and fever.   HENT: Negative for congestion, ear pain, hearing loss and sore throat.    Eyes: Negative for pain and visual disturbance.   Respiratory: Negative for cough and shortness of breath.    Cardiovascular: Negative for chest pain, palpitations and peripheral edema.   Gastrointestinal: Negative for abdominal pain, constipation, diarrhea, heartburn, hematochezia and nausea.   Genitourinary: Negative for dysuria, frequency, genital sores, hematuria, impotence, penile discharge and urgency.   Musculoskeletal: Negative for arthralgias, joint swelling and myalgias.   Skin: Negative for rash.   Neurological: Negative for dizziness, weakness, headaches and paresthesias.   Psychiatric/Behavioral: Negative for mood changes. The patient is not nervous/anxious.   "        OBJECTIVE:   /62 (BP Location: Right arm, Patient Position: Sitting)   Pulse 53   Temp 97.8  F (36.6  C)   Ht 1.892 m (6' 2.5\")   Wt 98.4 kg (217 lb)   SpO2 97%   BMI 27.49 kg/m   Estimated body mass index is 27.49 kg/m  as calculated from the following:    Height as of this encounter: 1.892 m (6' 2.5\").    Weight as of this encounter: 98.4 kg (217 lb).  Physical Exam  General: Alert, in no distress  Skin: No significant lesion seen.  Eyes/nose/throat: Eyes without scleral icterus, eye movements normal, pupils equal and reactive, oropharynx clear, ears with normal TM's  MSK: Neck with good ROM  Lymphatic: Neck without adenopathy or masses  Endocrine: Thyroid with no nodules to palpation  Pulm: Lungs clear to auscultation bilaterally  Cardiac: Heart with regular rate and rhythm  + 3/6 systolic ejection murmur  GI: Abdomen soft, nontender. No palpable enlargement of liver or spleen  MSK: Extremities no tenderness or edema  Neuro: Moves all extremities, without focal weakness  Psych: Alert, normal mental status. Normal affect and speech      ASSESSMENT / PLAN:     Annual welless visit    Mr. Reyers is feeling really good he states.  He just went through a round of executive health exam and recertification of his medical certificate as a , done at [facility] July 15, 2022.  That included a cardiac stress test, echocardiogram, CT scan of his abdomen and aortic ultrasound.    There has been slight enlargement of his renal oncocytoma, and he plans to pursue a biopsy at [place], and that may determine his imaging frequency going forward.    Today July 26, 2022, decided to reduce his hydrochlorothiazide a bit, dropping from 50 down to 25 mg a day.  Blood pressure control is been excellent, and may be he needs less blood pressure medication, particularly because he is lost some weight.    No lab work needed today.  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  His [facility] cardiologist is Dr. Antione Grijalva. Cardiac MRI July 2019 reported hypertrophic cardiomyopathy with 21 mm septal thickness.      [place] exercise stress test performed on 07/18/2022 was negative for ischemia. The  was able to exercise for 9.3 minutes, which is 106.4% of predicted. He achieved 106% of his predicted maximum heart rate. The panel was favorable with a total cholesterol of 118 mg/dL.    7- [place] Echo  Final Impressions   1. Sigmoid ventricular septum with basal septal prominence: 20 mm (consistent with known diagnosis of hypertrophic cardiomyopathy).   2. Normal left ventricular chamber size.   3. Calculated 2-D biplane volumetric left ventricular ejection fraction 61%.   4. No dynamic left ventricular outflow tract obstruction (at rest, with Valsalva maneuver).   5. Left ventricular outflow tract peak velocity with squat to stand 3.7 m/sec (55 mmHg).. Two dimensional imaging post  squat to stand was not adequate for visualization of the mitral valve, there was a slight increase in the degree of mitral   regurgitation post provocative maneuvers.   6. No systolic anterior motion of mitral apparatus at rest.   7. Mild mitral valve regurgitation.   8. Normal right ventricular chamber size.   9. Normal right ventricular systolic function.   10. Estimated right ventricular systolic pressure 41 mmHg (systolic blood pressure 143 mmHg).   11. Mildly enlarged proximal ascending aorta diameter of 42 mm.     Regarding coronary disease, he had an  exercise stress test with nuclear myocardial imaging 6/1/2020, which demonstrated an excellent exercise tolerance, going 12.7 minutes on the Reji protocol, 142% of predicted functional aerobic capacity.  The radionuclide imaging showed no signs of stress-induced ischemia.  This was his fourth nuclear cardiac stress test    Hyperlipidemia in the context of coronary disease, on high intensity rosuvastatin with great control of lipids  Lipid panel done at [facility] July 15, 2022 had total cholesterol of 118, triglyceride 67, LDL 51, HDL 53    Hypertension, currently on combination of   lisinopril 10 mg once a day  diltiazem extended release 180 mg once a day  small dose of metoprolol succinate 12.5 mg a day, and   hydrochlorothiazide for which I would reduce his dose from 50 down to 25 mg a day    Mr. Reyers told me that his blood pressure will symptoms run low, particular after exercise, dropping to around 100 systolic.  I think we should back down on the hydrochlorothiazide     Hypothyroidism, on a stable dose of levothyroxine 125 mcg/day.  7-   TSH 0.9    Renal oncocytoma, status post resection of 2 lesions in 2014, slight increase in the left-sided mass by CT scan 7-  He is working with [facility] urology, and likely will be pursuing a biopsy sometime this autumn 2022, which then may guide how frequently to do the surveillance.    7- [place]  1. Slight increase in size of the heterogeneously enhancing mass in the medial aspect of the lower   pole of the left kidney. The mass now measures 3.0 cm.   2. Indeterminate 6 mm hypoattenuating lesion in the posterior aspect of the right kidney which has   increased minimally since 07/08/2019 when it measured 4 mm. This lesion was not visible on CT   examinations prior to 2019.   3. Stable 3.5 cm fusiform infrarenal abdominal aortic aneurysm.    Abdominal aortic aneurysm measured 3.5 cm by CT scan July 2021, continue annual surveillance.   7-  ultrasound [place]  1. Stable 3.4 x 3.4 cm infrarenal abdominal aortic aneurysm.   2. 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    Benign prostatic hyperplasia, he struggles a bit empty his bladder completely.  I told him there are medication options to treat BPH to help empty his bladder.  One option might be tamsulosin 0.4 mg a day, although tamsulosin may lower blood pressure.  If he were to start tamsulosin, he may be necessary to back down on one of his other blood pressure medicines  PSA [facility] July 15, 2022  2.1    Personal history of tubular adenoma colon polyp  Colonoscopy April 26, 2021 notable for 2 polyps in the ascending colon, 6 mm, 5 mm.  One polyp ascending colon, 6 mm.  These were tubular adenomas, recheck recommended in 3 years which would be April 2024    Improved weight  Wt Readings from Last 5 Encounters:   07/26/22 98.4 kg (217 lb)   10/20/21 103.3 kg (227 lb 11.2 oz)   02/10/21 104.3 kg (230 lb)   12/05/19 103.9 kg (229 lb 0.6 oz)   05/25/18 99.8 kg (220 lb 0.6 oz)     Got Moderna Covid vaccine third shot February 19, 2021, he plans to wait for the updated booster later this autumn, formulated against the variants  For tetanus and shingles vaccine, he should get those at a community pharmacy, since those are reimbursed under his Medicare prescription drug plan    Immunization History   Administered Date(s) Administered     COVID-19,PF,Moderna 01/24/2021, 02/19/2021, 10/23/2021     Influenza (H1N1) 01/22/2010     Influenza Quad, Recombinant, pf(RIV4) (Flublok) 12/05/2019     Influenza Vaccine IM > 6 months Valent IIV4 (Alfuria,Fluzone) 10/31/2014     Influenza Vaccine, 6+MO IM (QUADRIVALENT W/PRESERVATIVES) 10/30/2015, 10/20/2020     Influenza, Quad, High Dose, Pf, 65yr+ (Fluzone HD) 10/20/2021     Pneumo Conj 13-V (2010&after) 10/30/2015     Pneumococcal 23 valent 10/20/2021       COUNSELING:  Reviewed preventive health  "counseling, as reflected in patient instructions       Healthy diet/nutrition    Estimated body mass index is 27.49 kg/m  as calculated from the following:    Height as of this encounter: 1.892 m (6' 2.5\").    Weight as of this encounter: 98.4 kg (217 lb).        He reports that he quit smoking about 56 years ago. He has never used smokeless tobacco.      Appropriate preventive services were discussed with this patient, including applicable screening as appropriate for cardiovascular disease, diabetes, osteopenia/osteoporosis, and glaucoma.  As appropriate for age/gender, discussed screening for colorectal cancer, prostate cancer, breast cancer, and cervical cancer. Checklist reviewing preventive services available has been given to the patient.    Reviewed patients plan of care and provided an AVS. The Basic Care Plan (routine screening as documented in Health Maintenance) for Harjinder meets the Care Plan requirement. This Care Plan has been established and reviewed with the Patient.      NELI ADAMS MD  [facility]      "

## 2022-10-01 ENCOUNTER — HEALTH MAINTENANCE LETTER (OUTPATIENT)
Age: 67
End: 2022-10-01

## 2022-10-06 DIAGNOSIS — I10 ESSENTIAL HYPERTENSION: ICD-10-CM

## 2022-10-06 RX ORDER — HYDROCHLOROTHIAZIDE 25 MG/1
25 TABLET ORAL DAILY
Qty: 90 TABLET | Refills: 2 | Status: SHIPPED | OUTPATIENT
Start: 2022-10-06 | End: 2023-05-11 | Stop reason: DRUGHIGH

## 2022-10-06 NOTE — TELEPHONE ENCOUNTER
Last Written Prescription Date:  7/26/22  Last Fill Quantity: 90,  # refills: 3   Last office visit provider:  7/26/22     -patient wants sent to mail order.. remaining refills sent to mail order.    Katerin Romero RN on 10/6/2022 at 2:12 PM

## 2023-04-04 DIAGNOSIS — E03.9 HYPOTHYROIDISM, UNSPECIFIED TYPE: ICD-10-CM

## 2023-04-04 RX ORDER — LEVOTHYROXINE SODIUM 125 UG/1
TABLET ORAL
Qty: 90 TABLET | Refills: 1 | Status: SHIPPED | OUTPATIENT
Start: 2023-04-04 | End: 2023-09-05

## 2023-04-04 NOTE — TELEPHONE ENCOUNTER
"Routing refill request to provider for review/approval because:  Labs not current:  TSH    Last Written Prescription Date:  7/26/22  Last Fill Quantity: 90,  # refills: 0   Last office visit provider:  7/26/22 w/ Dr. NADINE Early     Requested Prescriptions   Pending Prescriptions Disp Refills     levothyroxine (SYNTHROID/LEVOTHROID) 125 MCG tablet [Pharmacy Med Name: LEVOTHYROXINE 125 MCG TABLET] 90 tablet 0     Sig: TAKE 1 TABLET BY MOUTH EVERY DAY AT 6AM       Thyroid Protocol Failed - 4/4/2023 12:29 AM        Failed - Normal TSH on file in past 12 months     Recent Labs   Lab Test 02/10/21  1833   TSH 0.35              Passed - Patient is 12 years or older        Passed - Recent (12 mo) or future (30 days) visit within the authorizing provider's specialty     Patient has had an office visit with the authorizing provider or a provider within the authorizing providers department within the previous 12 mos or has a future within next 30 days. See \"Patient Info\" tab in inbasket, or \"Choose Columns\" in Meds & Orders section of the refill encounter.              Passed - Medication is active on med list             Tess Newsome RN 04/04/23 11:40 AM  "

## 2023-04-27 DIAGNOSIS — E78.5 HYPERLIPIDEMIA WITH TARGET LDL LESS THAN 70: ICD-10-CM

## 2023-04-28 RX ORDER — ROSUVASTATIN CALCIUM 40 MG/1
40 TABLET, COATED ORAL DAILY
Qty: 90 TABLET | Refills: 3 | Status: SHIPPED | OUTPATIENT
Start: 2023-04-28 | End: 2024-01-09

## 2023-04-28 NOTE — TELEPHONE ENCOUNTER
"Routing refill request to provider for review/approval because:  Labs not current:  LDL    Last Written Prescription Date:  02/16/2022  Last Fill Quantity: 90,  # refills: 3   Last office visit provider:  07/26/2022     Requested Prescriptions   Pending Prescriptions Disp Refills     rosuvastatin (CRESTOR) 40 MG tablet 90 tablet 3     Sig: Take 1 tablet (40 mg) by mouth daily       Statins Protocol Failed - 4/28/2023 12:33 PM        Failed - LDL on file in past 12 months     No lab results found.          Passed - No abnormal creatine kinase in past 12 months     No lab results found.             Passed - Recent (12 mo) or future (30 days) visit within the authorizing provider's specialty     Patient has had an office visit with the authorizing provider or a provider within the authorizing providers department within the previous 12 mos or has a future within next 30 days. See \"Patient Info\" tab in inbasket, or \"Choose Columns\" in Meds & Orders section of the refill encounter.              Passed - Medication is active on med list        Passed - Patient is age 18 or older             Sommer Cerda RN 04/28/23 12:33 PM  "

## 2023-05-11 ENCOUNTER — MYC MEDICAL ADVICE (OUTPATIENT)
Dept: INTERNAL MEDICINE | Facility: CLINIC | Age: 68
End: 2023-05-11
Payer: COMMERCIAL

## 2023-05-11 DIAGNOSIS — I10 ESSENTIAL HYPERTENSION, BENIGN: Primary | ICD-10-CM

## 2023-05-11 RX ORDER — HYDROCHLOROTHIAZIDE 12.5 MG/1
12.5 TABLET ORAL DAILY
Qty: 90 TABLET | Refills: 3 | Status: SHIPPED | OUTPATIENT
Start: 2023-05-11 | End: 2023-08-01

## 2023-06-28 ENCOUNTER — MYC MEDICAL ADVICE (OUTPATIENT)
Dept: INTERNAL MEDICINE | Facility: CLINIC | Age: 68
End: 2023-06-28
Payer: COMMERCIAL

## 2023-06-28 DIAGNOSIS — I10 ESSENTIAL HYPERTENSION: Primary | ICD-10-CM

## 2023-06-30 RX ORDER — LISINOPRIL 20 MG/1
20 TABLET ORAL DAILY
Qty: 90 TABLET | Refills: 3 | Status: SHIPPED | OUTPATIENT
Start: 2023-06-30 | End: 2024-01-09

## 2023-08-01 DIAGNOSIS — I10 ESSENTIAL HYPERTENSION, BENIGN: ICD-10-CM

## 2023-08-02 RX ORDER — HYDROCHLOROTHIAZIDE 12.5 MG/1
12.5 TABLET ORAL DAILY
Qty: 90 TABLET | Refills: 3 | Status: SHIPPED | OUTPATIENT
Start: 2023-08-02 | End: 2024-01-09 | Stop reason: DRUGHIGH

## 2023-08-02 NOTE — TELEPHONE ENCOUNTER
"Routing refill request to provider for review/approval because:  Labs not current:  creatinine, potassium, sodium not on file for past 12 months.  Needs appointment. Failed b/p not within range.    Last Written Prescription Date:  5-11-23  Last Fill Quantity: 90,  # refills: 3   Last office visit provider:  7-26-22     Requested Prescriptions   Pending Prescriptions Disp Refills    hydrochlorothiazide (HYDRODIURIL) 12.5 MG tablet 90 tablet 3     Sig: Take 1 tablet (12.5 mg) by mouth daily       Diuretics (Including Combos) Protocol Failed - 8/1/2023  5:28 PM        Failed - Blood pressure under 140/90 in past 12 months     BP Readings from Last 3 Encounters:   07/26/22 112/62   10/20/21 136/76   02/10/21 (!) 156/81                 Failed - Recent (12 mo) or future (30 days) visit within the authorizing provider's specialty     Patient has had an office visit with the authorizing provider or a provider within the authorizing providers department within the previous 12 mos or has a future within next 30 days. See \"Patient Info\" tab in inbasket, or \"Choose Columns\" in Meds & Orders section of the refill encounter.              Failed - Normal serum creatinine on file in past 12 months     Recent Labs   Lab Test 12/05/19  1122   CR 1.13              Failed - Normal serum potassium on file in past 12 months     Recent Labs   Lab Test 12/05/19  1122   POTASSIUM 3.3*                    Failed - Normal serum sodium on file in past 12 months     Recent Labs   Lab Test 12/05/19  1122                 Passed - Medication is active on med list        Passed - Patient is age 18 or older         Regina De Los Santos RN  [facility]      Regina De Los Santos RN 08/02/23 1:00 PM  "

## 2023-09-05 DIAGNOSIS — E03.9 HYPOTHYROIDISM, UNSPECIFIED TYPE: ICD-10-CM

## 2023-09-06 RX ORDER — LEVOTHYROXINE SODIUM 125 UG/1
TABLET ORAL
Qty: 90 TABLET | Refills: 1 | Status: SHIPPED | OUTPATIENT
Start: 2023-09-06 | End: 2024-01-09

## 2023-09-06 NOTE — TELEPHONE ENCOUNTER
"Routing refill request to provider for review/approval because:  Labs not current:  TSH  Patient needs to be seen because it has been more than 1 year since last office visit.    Last Written Prescription Date:  4/4/23  Last Fill Quantity: 90,  # refills: 1   Last office visit provider:  7/26/22     Requested Prescriptions   Pending Prescriptions Disp Refills    levothyroxine (SYNTHROID/LEVOTHROID) 125 MCG tablet 90 tablet 1     Sig: TAKE 1 TABLET BY MOUTH EVERY DAY AT 6AM       Thyroid Protocol Failed - 9/5/2023  6:38 PM        Failed - Recent (12 mo) or future (30 days) visit within the authorizing provider's specialty     Patient has had an office visit with the authorizing provider or a provider within the authorizing providers department within the previous 12 mos or has a future within next 30 days. See \"Patient Info\" tab in inbasket, or \"Choose Columns\" in Meds & Orders section of the refill encounter.              Failed - Normal TSH on file in past 12 months     Recent Labs   Lab Test 02/10/21  1833   TSH 0.35              Passed - Patient is 12 years or older        Passed - Medication is active on med list             Chiquita Kolb 09/06/23 2:02 PM  "

## 2023-09-22 ENCOUNTER — VIRTUAL VISIT (OUTPATIENT)
Dept: INTERNAL MEDICINE | Facility: CLINIC | Age: 68
End: 2023-09-22
Payer: COMMERCIAL

## 2023-09-22 DIAGNOSIS — I10 ESSENTIAL HYPERTENSION, BENIGN: Primary | ICD-10-CM

## 2023-09-22 PROCEDURE — 99213 OFFICE O/P EST LOW 20 MIN: CPT | Mod: 95 | Performed by: INTERNAL MEDICINE

## 2023-09-22 NOTE — PROGRESS NOTES
Harjinder is a 67 year old who is being evaluated via a billable video visit.      How would you like to obtain your AVS? Mail a copy  If the video visit is dropped, the invitation should be resent by: Send to e-mail at: [email or web address]  Will anyone else be joining your video visit? No        Subjective   Harjinder is a 67 year old, presenting for the following health issues:  Hypertension      9/22/2023     2:33 PM   Additional Questions   Roomed by Elana       History of Present Illness       Hypertension: He presents for follow up of hypertension.  He does check blood pressure  regularly outside of the clinic. Outside blood pressures have been over 140/90. He follows a low salt diet.     He eats 2-3 servings of fruits and vegetables daily.He consumes 1 sweetened beverage(s) daily.He exercises with enough effort to increase his heart rate 30 to 60 minutes per day.  He exercises with enough effort to increase his heart rate 4 days per week.   He is taking medications regularly.       Harjinder wanted to review blood pressure data, and also reports some curious spells that awoken him from sleep last week.    He sent me via [employer] a graph of blood pressure readings in 2022 and 2023.  This is done with his home blood pressure machine that he puts on his left upper arm.  He told me that this machine has not yet been validated against a manual reading, so I told him that it would be important to do that.    Most of the systolic readings are between 120 and 160, diastolic around 80-85.    He told that he feels just fine.  He is on a stable regimen of blood pressure medication.    I suggested that he start measuring blood pressures in the right upper arm, which is the side that we generally use in the clinic.  Have blood pressure measured manually by healthcare professional, and correlate that with his machine.    For the moment, leave his blood pressure medication regimen as is.    He also told me about a curious spell that occurred  last week.  About 2 hours after falling asleep, he awoke with a pressure sensation in the head, chest pounding, and systolic blood pressure elevated around 170.  Then he was able to fall back asleep after about half an hour, and finally woke up in the morning feeling just fine.    I told him about possibility of nasal congestion or allergic phenomenon.  The fact that he feels fine another context, including exercising vigorously, suggest that he is not experiencing a heart problem.    ASSESSMENT PLAN    Hypertension, currently on combination of   lisinopril 10 mg once a day  diltiazem extended release 180 mg once a day  small dose of metoprolol succinate 12.5 mg a day, and   hydrochlorothiazide for which I would reduce his dose from 50 down to 25 mg a day    Hypertrophic cardiomyopathy and coronary artery disease, with history of two-vessel coronary bypass grafting in 2004, not experiencing any cardiac symptoms     Video-Visit Details    Type of service:  Video Visit   Video Start Time:  2:50 PM  Video End Time:3:02 PM    Originating Location (pt. Location): Home    Distant Location (provider location):  On-site  Platform used for Video Visit: Moncho

## 2023-10-04 ENCOUNTER — ALLIED HEALTH/NURSE VISIT (OUTPATIENT)
Dept: FAMILY MEDICINE | Facility: CLINIC | Age: 68
End: 2023-10-04
Payer: COMMERCIAL

## 2023-10-04 VITALS — SYSTOLIC BLOOD PRESSURE: 138 MMHG | DIASTOLIC BLOOD PRESSURE: 80 MMHG

## 2023-10-04 DIAGNOSIS — Z01.30 BLOOD PRESSURE CHECK: Primary | ICD-10-CM

## 2023-10-04 DIAGNOSIS — Z23 NEEDS FLU SHOT: ICD-10-CM

## 2023-10-04 PROCEDURE — 99207 PR NO CHARGE NURSE ONLY: CPT

## 2023-10-04 PROCEDURE — 90662 IIV NO PRSV INCREASED AG IM: CPT

## 2023-10-04 PROCEDURE — G0008 ADMIN INFLUENZA VIRUS VAC: HCPCS

## 2023-10-04 NOTE — PROGRESS NOTES
Jan Reyers is a 67 year old patient who comes in today for a Blood Pressure check.  Initial BP:  BP (!) 142/80 (BP Location: Right arm, Patient Position: Sitting, Cuff Size: Adult Regular)      Data Unavailable  Disposition: results routed to provider     I met with Jan Reyers at the request of 120/80 to recheck his blood pressure.  Blood pressure medications on the med list were reviewed with patient.    Patient has taken all medications as per usual regimen: Yes  Patient reports tolerating them without any issues or concerns: No    There were no vitals filed for this visit.    After 5 minutes, the patient's blood pressure was <140/90, the previous encounter was reviewed, recorded blood pressure below 140/90. Patient was discharged and the note will be sent to the provider for final review.     Prior to immunization administration, verified patients identity using patient s name and date of birth. Please see Immunization Activity for additional information.     Screening Questionnaire for Adult Immunization    Are you sick today?   No   Do you have allergies to medications, food, a vaccine component or latex?   No   Have you ever had a serious reaction after receiving a vaccination?   No   Do you have a long-term health problem with heart, lung, kidney, or metabolic disease (e.g., diabetes), asthma, a blood disorder, no spleen, complement component deficiency, a cochlear implant, or a spinal fluid leak?  Are you on long-term aspirin therapy?   No   Do you have cancer, leukemia, HIV/AIDS, or any other immune system problem?   No   Do you have a parent, brother, or sister with an immune system problem?   No   In the past 3 months, have you taken medications that affect  your immune system, such as prednisone, other steroids, or anticancer drugs; drugs for the treatment of rheumatoid arthritis, Crohn s disease, or psoriasis; or have you had radiation treatments?   No   Have you had a seizure, or a brain or other  nervous system problem?   No   During the past year, have you received a transfusion of blood or blood    products, or been given immune (gamma) globulin or antiviral drug?   No   For women: Are you pregnant or is there a chance you could become       pregnant during the next month?   No   Have you received any vaccinations in the past 4 weeks?   No     Immunization questionnaire answers were all negative.    I have reviewed the following standing orders:   This patient is due and qualifies for the Influenza vaccine.    Click here for Influenza Vaccine Standing Order    I have reviewed the vaccines inclusion and exclusion criteria; No concerns regarding eligibility.     Patient instructed to remain in clinic for 15 minutes afterwards, and to report any adverse reactions.     Screening performed by Sary Montgomery MA on 10/4/2023 at 10:01 AM.

## 2023-10-12 ENCOUNTER — MYC MEDICAL ADVICE (OUTPATIENT)
Dept: INTERNAL MEDICINE | Facility: CLINIC | Age: 68
End: 2023-10-12
Payer: COMMERCIAL

## 2023-10-21 ENCOUNTER — HEALTH MAINTENANCE LETTER (OUTPATIENT)
Age: 68
End: 2023-10-21

## 2023-10-23 ENCOUNTER — PATIENT OUTREACH (OUTPATIENT)
Dept: GASTROENTEROLOGY | Facility: CLINIC | Age: 68
End: 2023-10-23
Payer: COMMERCIAL

## 2023-10-25 ENCOUNTER — MYC MEDICAL ADVICE (OUTPATIENT)
Dept: INTERNAL MEDICINE | Facility: CLINIC | Age: 68
End: 2023-10-25
Payer: COMMERCIAL

## 2023-10-25 DIAGNOSIS — I10 ESSENTIAL HYPERTENSION, BENIGN: Primary | ICD-10-CM

## 2023-10-26 RX ORDER — HYDROCHLOROTHIAZIDE 25 MG/1
25 TABLET ORAL DAILY
Qty: 90 TABLET | Refills: 1 | Status: SHIPPED | OUTPATIENT
Start: 2023-10-26 | End: 2024-01-09

## 2023-10-26 NOTE — TELEPHONE ENCOUNTER
"Dose increase per [facility] encounter 10/12/23:  \"In that case, lets bump up the hydrochlorothiazide.  According to my records, you have the 12.5 mg tablets.  Try taking 2 of them to equal 25 mg.  If that gets the job done, then I can update that hydrochlorothiazide prescription to 25 mg.\"    Rx pended for hydrochlorothiazide 25 mg daily and routed to refill pool.     MARTHA RaiN, RN  [facility]    "

## 2024-01-08 ASSESSMENT — ENCOUNTER SYMPTOMS
CONSTIPATION: 0
PARESTHESIAS: 0
ABDOMINAL PAIN: 0
PALPITATIONS: 0
HEMATOCHEZIA: 0
DYSURIA: 0
EYE PAIN: 0
NERVOUS/ANXIOUS: 0
HEMATURIA: 0
HEADACHES: 0
FEVER: 0
ARTHRALGIAS: 0
NAUSEA: 0
FREQUENCY: 0
DIARRHEA: 0
SHORTNESS OF BREATH: 0
COUGH: 0
DIZZINESS: 0
SORE THROAT: 0
CHILLS: 0
JOINT SWELLING: 1
HEARTBURN: 0
MYALGIAS: 0
WEAKNESS: 0

## 2024-01-08 ASSESSMENT — ACTIVITIES OF DAILY LIVING (ADL): CURRENT_FUNCTION: NO ASSISTANCE NEEDED

## 2024-01-09 ENCOUNTER — OFFICE VISIT (OUTPATIENT)
Dept: INTERNAL MEDICINE | Facility: CLINIC | Age: 69
End: 2024-01-09
Payer: COMMERCIAL

## 2024-01-09 VITALS
RESPIRATION RATE: 16 BRPM | SYSTOLIC BLOOD PRESSURE: 128 MMHG | TEMPERATURE: 98.2 F | OXYGEN SATURATION: 97 % | HEART RATE: 61 BPM | HEIGHT: 75 IN | DIASTOLIC BLOOD PRESSURE: 64 MMHG | BODY MASS INDEX: 27.73 KG/M2 | WEIGHT: 223 LBS

## 2024-01-09 DIAGNOSIS — Z12.5 SCREENING PSA (PROSTATE SPECIFIC ANTIGEN): ICD-10-CM

## 2024-01-09 DIAGNOSIS — I10 ESSENTIAL HYPERTENSION: ICD-10-CM

## 2024-01-09 DIAGNOSIS — E78.5 HYPERLIPIDEMIA WITH TARGET LDL LESS THAN 70: ICD-10-CM

## 2024-01-09 DIAGNOSIS — I25.119 CORONARY ARTERY DISEASE INVOLVING NATIVE CORONARY ARTERY OF NATIVE HEART WITH ANGINA PECTORIS (H): ICD-10-CM

## 2024-01-09 DIAGNOSIS — D30.01 RENAL ONCOCYTOMA OF RIGHT KIDNEY: ICD-10-CM

## 2024-01-09 DIAGNOSIS — I10 ESSENTIAL HYPERTENSION, BENIGN: ICD-10-CM

## 2024-01-09 DIAGNOSIS — E03.9 HYPOTHYROIDISM, UNSPECIFIED TYPE: ICD-10-CM

## 2024-01-09 DIAGNOSIS — Z00.00 ROUTINE GENERAL MEDICAL EXAMINATION AT A HEALTH CARE FACILITY: Primary | ICD-10-CM

## 2024-01-09 PROCEDURE — G0439 PPPS, SUBSEQ VISIT: HCPCS | Performed by: INTERNAL MEDICINE

## 2024-01-09 PROCEDURE — 99214 OFFICE O/P EST MOD 30 MIN: CPT | Mod: 25 | Performed by: INTERNAL MEDICINE

## 2024-01-09 RX ORDER — DILTIAZEM HYDROCHLORIDE 180 MG/1
180 CAPSULE, COATED, EXTENDED RELEASE ORAL DAILY
Qty: 90 CAPSULE | Refills: 3 | Status: SHIPPED | OUTPATIENT
Start: 2024-01-09

## 2024-01-09 RX ORDER — LISINOPRIL 20 MG/1
20 TABLET ORAL DAILY
Qty: 90 TABLET | Refills: 3 | Status: SHIPPED | OUTPATIENT
Start: 2024-01-09

## 2024-01-09 RX ORDER — LEVOTHYROXINE SODIUM 125 UG/1
TABLET ORAL
Qty: 90 TABLET | Refills: 3 | Status: SHIPPED | OUTPATIENT
Start: 2024-01-09

## 2024-01-09 RX ORDER — NITROGLYCERIN 0.4 MG/1
0.4 TABLET SUBLINGUAL EVERY 5 MIN PRN
Qty: 30 TABLET | Refills: 3 | Status: SHIPPED | OUTPATIENT
Start: 2024-01-09

## 2024-01-09 RX ORDER — ROSUVASTATIN CALCIUM 40 MG/1
40 TABLET, COATED ORAL DAILY
Qty: 90 TABLET | Refills: 3 | Status: SHIPPED | OUTPATIENT
Start: 2024-01-09

## 2024-01-09 RX ORDER — METOPROLOL SUCCINATE 25 MG/1
12.5 TABLET, EXTENDED RELEASE ORAL DAILY
Qty: 45 TABLET | Refills: 3 | Status: SHIPPED | OUTPATIENT
Start: 2024-01-09

## 2024-01-09 RX ORDER — HYDROCHLOROTHIAZIDE 25 MG/1
25 TABLET ORAL DAILY
Qty: 90 TABLET | Refills: 3 | Status: SHIPPED | OUTPATIENT
Start: 2024-01-09

## 2024-01-09 ASSESSMENT — ENCOUNTER SYMPTOMS
WEAKNESS: 0
ABDOMINAL PAIN: 0
JOINT SWELLING: 1
EYE PAIN: 0
SHORTNESS OF BREATH: 0
HEARTBURN: 0
CHILLS: 0
COUGH: 0
NERVOUS/ANXIOUS: 0
NAUSEA: 0
DYSURIA: 0
ARTHRALGIAS: 0
DIARRHEA: 0
HEMATURIA: 0
MYALGIAS: 0
PARESTHESIAS: 0
HEMATOCHEZIA: 0
SORE THROAT: 0
FEVER: 0
PALPITATIONS: 0
DIZZINESS: 0
CONSTIPATION: 0
HEADACHES: 0
FREQUENCY: 0

## 2024-01-09 ASSESSMENT — ACTIVITIES OF DAILY LIVING (ADL): CURRENT_FUNCTION: NO ASSISTANCE NEEDED

## 2024-01-09 NOTE — PROGRESS NOTES
"SUBJECTIVE:   Harjinder is a 68 year old, presenting for the following:  Physical        1/9/2024     1:50 PM   Additional Questions   Roomed by Elana       Are you in the first 12 months of your Medicare coverage?  No    Healthy Habits:     In general, how would you rate your overall health?  Good    Frequency of exercise:  4-5 days/week    Duration of exercise:  30-45 minutes    Do you usually eat at least 4 servings of fruit and vegetables a day, include whole grains    & fiber and avoid regularly eating high fat or \"junk\" foods?  Yes    Taking medications regularly:  Yes    Medication side effects:  None    Ability to successfully perform activities of daily living:  No assistance needed    Home Safety:  Lack of grab bars in the bathroom    Hearing Impairment:  No hearing concerns    In the past 6 months, have you been bothered by leaking of urine?  No    In general, how would you rate your overall mental or emotional health?  Excellent    Additional concerns today:  Yes    Today's PHQ-2 Score:       1/8/2024     4:50 PM   PHQ-2 ( 1999 Pfizer)   Q1: Little interest or pleasure in doing things 0   Q2: Feeling down, depressed or hopeless 0   PHQ-2 Score 0   Q1: Little interest or pleasure in doing things Not at all   Q2: Feeling down, depressed or hopeless Not at all   PHQ-2 Score 0           Have you ever done Advance Care Planning? (For example, a Health Directive, POLST, or a discussion with a medical provider or your loved ones about your wishes): Yes, advance care planning is on file.       Fall risk  Fallen 2 or more times in the past year?: No  Any fall with injury in the past year?: No    Cognitive Screening   1) Repeat 3 items (Leader, Season, Table)    2) Clock draw: NORMAL  3) 3 item recall: Recalls 3 objects  Results: 3 items recalled: COGNITIVE IMPAIRMENT LESS LIKELY    Mini-CogTM Copyright CARISSA Rai. Licensed by the author for use in [facility]; reprinted with permission (ashley@.[facility]). All " Problem: Pain  Goal: Verbalizes/displays adequate comfort level or baseline comfort level  Outcome: Progressing rights reserved.      Do you have sleep apnea, excessive snoring or daytime drowsiness? : no    Reviewed and updated as needed this visit by clinical staff   Tobacco  Allergies  Meds              Reviewed and updated as needed this visit by Provider                 Social History     Tobacco Use    Smoking status: Former     Types: Cigarettes     Quit date: 10/30/1965     Years since quittin.2     Passive exposure: Past    Smokeless tobacco: Never   Substance Use Topics    Alcohol use: Not on file         2024     4:49 PM   Alcohol Use   Prescreen: >3 drinks/day or >7 drinks/week? No     Do you have a current opioid prescription? No  Do you use any other controlled substances or medications that are not prescribed by a provider? None    Current providers sharing in care for this patient include:   Patient Care Team:  Christopher Early MD as PCP - General (Internal Medicine)  Christopher Early MD as Assigned PCP    The following health maintenance items are reviewed in Epic and correct as of today:  Health Maintenance   Topic Date Due    ANNUAL REVIEW OF HM ORDERS  Never done    HEPATITIS C SCREENING  Never done    DTAP/TDAP/TD IMMUNIZATION (1 - Tdap) Never done    LIPID  Never done    RSV VACCINE (Pregnancy & 60+) (1 - 1-dose 60+ series) Never done    TSH W/FREE T4 REFLEX  02/10/2022    MEDICARE ANNUAL WELLNESS VISIT  2023    COVID-19 Vaccine ( season) 2023    COLORECTAL CANCER SCREENING  2024    FALL RISK ASSESSMENT  2025    ADVANCE CARE PLANNING  2027    PHQ-2 (once per calendar year)  Completed    INFLUENZA VACCINE  Completed    Pneumococcal Vaccine: 65+ Years  Completed    ZOSTER IMMUNIZATION  Completed    AORTIC ANEURYSM SCREENING (SYSTEM ASSIGNED)  Completed    IPV IMMUNIZATION  Aged Out    HPV IMMUNIZATION  Aged Out    MENINGITIS IMMUNIZATION  Aged Out    RSV MONOCLONAL ANTIBODY  Aged Out     Review of Systems   Constitutional:  Negative for chills and  "fever.   HENT:  Negative for congestion, ear pain, hearing loss and sore throat.    Eyes:  Negative for pain and visual disturbance.   Respiratory:  Negative for cough and shortness of breath.    Cardiovascular:  Negative for chest pain, palpitations and peripheral edema.   Gastrointestinal:  Negative for abdominal pain, constipation, diarrhea, heartburn, hematochezia and nausea.   Genitourinary:  Negative for dysuria, frequency, genital sores, hematuria, impotence, penile discharge and urgency.   Musculoskeletal:  Positive for joint swelling. Negative for arthralgias and myalgias.   Skin:  Negative for rash.   Neurological:  Negative for dizziness, weakness, headaches and paresthesias.   Psychiatric/Behavioral:  Negative for mood changes. The patient is not nervous/anxious.        OBJECTIVE:   /64 (BP Location: Right arm, Patient Position: Sitting, Cuff Size: Adult Regular)   Pulse 61   Temp 98.2  F (36.8  C)   Resp 16   Ht 1.892 m (6' 2.5\")   Wt 101.2 kg (223 lb)   SpO2 97%   BMI 28.25 kg/m   Estimated body mass index is 28.25 kg/m  as calculated from the following:    Height as of this encounter: 1.892 m (6' 2.5\").    Weight as of this encounter: 101.2 kg (223 lb).  Physical Exam    General: Alert, in no distress  Skin: No significant lesion seen.  Eyes/nose/throat: Eyes without scleral icterus, eye movements normal, pupils equal and reactive, oropharynx clear, ears with normal TM's  MSK: Neck with good ROM  Lymphatic: Neck without adenopathy or masses  Endocrine: Thyroid with no nodules to palpation  Pulm: Lungs clear to auscultation bilaterally  Cardiac: Heart with regular rate and rhythm, + 2/6 systolic ejection murmur  GI: Abdomen + old scar,   Soft, nontender. No palpable enlargement of liver or spleen  MSK: Extremities no tenderness or edema  Neuro: Moves all extremities, without focal weakness  Psych: Alert, normal mental status. Normal affect and speech    ASSESSMENT / PLAN:     Annual " wellness exam    Mr. Reyers is feeling really good he states.   He just went through re-certification of his medical certificate as a , done at [facility] July 2022 That included a cardiac stress test, echocardiogram, CT scan of his abdomen and aortic ultrasound.  Upcoming [place] general exam July 2024    68-year-old man, retired  executive, who also gets medical care at [facility] from the executive health program and also [facility] cardiology and urology    He is going to come in for fasting on a future morning, at which time we will check comprehensive metabolic panel, blood cell counts, TSH to monitor levothyroxine therapy, and lipid profile and screening PSA    Hypertrophic cardiomyopathy and coronary artery disease, with history of two-vessel coronary bypass grafting in 2004, not experiencing any cardiac symptoms.  His [facility] cardiologist is Dr. Antione Grijalva. Cardiac MRI July 2019 reported hypertrophic cardiomyopathy with 21 mm septal thickness.       [place] exercise stress test performed on 07/18/2022 was negative for ischemia. The  was able to exercise for 9.3 minutes, which is 106.4% of predicted. He achieved 106% of his predicted maximum heart rate. The panel was favorable with a total cholesterol of 118 mg/dL.     7- [place] Echo  Final Impressions   1. Sigmoid ventricular septum with basal septal prominence: 20 mm (consistent with known diagnosis of hypertrophic cardiomyopathy).  2. Normal left ventricular chamber size.   3. Calculated 2-D biplane volumetric left ventricular ejection fraction 61%.   4. No dynamic left ventricular outflow tract obstruction (at rest, with Valsalva maneuver).   5. Left ventricular outflow tract peak velocity with squat to stand 3.7 m/sec (55 mmHg).. Two dimensional imaging post  squat to stand was not adequate for visualization of the mitral valve, there was a slight increase in the degree of mitral   regurgitation post provocative maneuvers.    6. No systolic anterior motion of mitral apparatus at rest.   7. Mild mitral valve regurgitation.   8. Normal right ventricular chamber size.   9. Normal right ventricular systolic function.   10. Estimated right ventricular systolic pressure 41 mmHg (systolic blood pressure 143 mmHg).   11. Mildly enlarged proximal ascending aorta diameter of 42 mm.      Regarding coronary disease, he had an exercise stress test with nuclear myocardial imaging 6/1/2020, which demonstrated an excellent exercise tolerance, going 12.7 minutes on the Reji protocol, 142% of predicted functional aerobic capacity.  The radionuclide imaging showed no signs of stress-induced ischemia.  This was his fourth nuclear cardiac stress test    Hyperlipidemia in the context of coronary disease, on high intensity rosuvastatin with great control of lipids  Lipid panel done at [facility] July 15, 2022 had total cholesterol of 118, triglyceride 67, LDL 51, HDL 53     Hypertension, currently on combination of   lisinopril 20 mg once a day  diltiazem extended release 180 mg once a day  small dose of metoprolol succinate 12.5 mg a day  hydrochlorothiazide 25 mg a day--he try reducing to 12.5 mg a day, but that resulted in higher blood pressures, so he went back to 25 mg and feels fine    [place] cardiology recommend to decrease or discontinue hydrochlorothiazide all together, BP permitting, in favor of up titrating BB and/or lisinopril.    BP Readings from Last 6 Encounters:   01/09/24 128/64   10/04/23 138/80   07/26/22 112/62   10/20/21 136/76   02/10/21 (!) 156/81   12/05/19 (!) 145/92     Hypothyroidism, on a stable dose of levothyroxine 125 mcg/day.  7-   TSH 0.9    Renal oncocytoma, status post resection of 2 lesions in 2014, low-grade neoplasm was again demonstrated on a fine-needle aspiration done at [place] January 18, 2023, strategy is observation  status post ultrasound-guided cytology fine-needle aspiration of left kidney mass on  01/18/2023. Pathology results showed; A. Kidney, Left mass, fine needle aspiration (smears/core biopsy): Positive for neoplasm. Low-grade oncocytic neoplasm, Favor oncocytoma.      7- [place]  1. Slight increase in size of the heterogeneously enhancing mass in the medial aspect of the lower   pole of the left kidney. The mass now measures 3.0 cm.   2. Indeterminate 6 mm hypoattenuating lesion in the posterior aspect of the right kidney which has   increased minimally since 07/08/2019 when it measured 4 mm. This lesion was not visible on CT   examinations prior to 2019.   3. Stable 3.5 cm fusiform infrarenal abdominal aortic aneurysm.     Abdominal aortic aneurysm measured 3.5 cm by CT scan July 2021, continue annual surveillance.   7- ultrasound [place]  1. Stable 3.4 x 3.4 cm infrarenal abdominal aortic aneurysm.   2. 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     1-6-2023 infrarenal aorta measuring 35 mm    Benign prostatic hyperplasia, he struggles a bit empty his bladder completely.  I told him there are medication options to treat BPH to help empty his bladder.  One option might be tamsulosin 0.4 mg a day, although tamsulosin may lower blood pressure.  If he were to start tamsulosin, he may be necessary to back down on one of his other blood pressure medicines  PSA [facility] July 15, 2022  2.1    Personal history of tubular adenoma colon polyp  Colonoscopy April 26, 2021 notable for 2 polyps in the ascending colon, 6 mm, 5 mm.  One polyp ascending colon, 6 mm.  These were tubular adenomas, recheck recommended in 3 years which would be April 2024.  He should expect a reminder letter from [facility]    Improved weight  Wt Readings from Last 5 Encounters:   01/09/24 101.2 kg (223 lb)   07/26/22 98.4 kg (217 lb)   10/20/21 103.3 kg (227 lb 11.2 oz)   02/10/21 104.3 kg (230 lb)   12/05/19 103.9 kg (229 lb 0.6 oz)     He is going to  "check his records, but I would suggest that he get the RSV vaccine at a local pharmacy  Immunization History   Administered Date(s) Administered    COVID-19 Monovalent 18+ (Moderna) 01/24/2021, 02/19/2021, 10/23/2021    Influenza (H1N1) 01/22/2010    Influenza Vaccine 18-64 (Flublok) 12/05/2019    Influenza Vaccine 65+ (Fluzone HD) 10/20/2021, 10/10/2022, 10/04/2023    Influenza Vaccine >6 months,quad, PF 10/31/2014    Influenza Vaccine, 6+MO IM (QUADRIVALENT W/PRESERVATIVES) 10/30/2015, 10/20/2020    Pneumo Conj 13-V (2010&after) 10/30/2015    Pneumococcal 23 valent 10/20/2021    Zoster recombinant adjuvanted (SHINGRIX) 10/10/2022, 03/31/2023         COUNSELING:  Reviewed preventive health counseling, as reflected in patient instructions       Healthy diet/nutrition      BMI:   Estimated body mass index is 28.25 kg/m  as calculated from the following:    Height as of this encounter: 1.892 m (6' 2.5\").    Weight as of this encounter: 101.2 kg (223 lb).         He reports that he quit smoking about 58 years ago. His smoking use included cigarettes. He has been exposed to tobacco smoke. He has never used smokeless tobacco.      Appropriate preventive services were discussed with this patient, including applicable screening as appropriate for fall prevention, nutrition, physical activity, Tobacco-use cessation, weight loss and cognition.  Checklist reviewing preventive services available has been given to the patient.    Reviewed patients plan of care and provided an AVS. The Basic Care Plan (routine screening as documented in Health Maintenance) for Harjinder meets the Care Plan requirement. This Care Plan has been established and reviewed with the Patient.        NELI ADAMS MD  [facility]    Identified Health Risks:  I have reviewed Opioid Use Disorder and Substance Use Disorder risk factors and made any needed referrals.   "

## 2024-01-09 NOTE — PATIENT INSTRUCTIONS
Annual wellness exam    Mr. Reyers is feeling really good he states.   He just went through re-certification of his medical certificate as a , done at [facility] July 2022 That included a cardiac stress test, echocardiogram, CT scan of his abdomen and aortic ultrasound.  Upcoming [facility] general exam July 2024    68-year-old man, retired  executive, who also gets medical care at [facility] from the executive health program and also [facility] cardiology and urology    He is going to come in for fasting on a future morning, at which time we will check comprehensive metabolic panel, blood cell counts, TSH to monitor levothyroxine therapy, and lipid profile and screening PSA    Hypertrophic cardiomyopathy and coronary artery disease, with history of two-vessel coronary bypass grafting in 2004, not experiencing any cardiac symptoms.  His [facility] cardiologist is Dr. Antione Grijalva. Cardiac MRI July 2019 reported hypertrophic cardiomyopathy with 21 mm septal thickness.       [place] exercise stress test performed on 07/18/2022 was negative for ischemia. The  was able to exercise for 9.3 minutes, which is 106.4% of predicted. He achieved 106% of his predicted maximum heart rate. The panel was favorable with a total cholesterol of 118 mg/dL.     7- [place] Echo  Final Impressions   1. Sigmoid ventricular septum with basal septal prominence: 20 mm (consistent with known diagnosis of hypertrophic cardiomyopathy).  2. Normal left ventricular chamber size.   3. Calculated 2-D biplane volumetric left ventricular ejection fraction 61%.   4. No dynamic left ventricular outflow tract obstruction (at rest, with Valsalva maneuver).   5. Left ventricular outflow tract peak velocity with squat to stand 3.7 m/sec (55 mmHg).. Two dimensional imaging post  squat to stand was not adequate for visualization of the mitral valve, there was a slight increase in the degree of mitral   regurgitation post provocative  maneuvers.   6. No systolic anterior motion of mitral apparatus at rest.   7. Mild mitral valve regurgitation.   8. Normal right ventricular chamber size.   9. Normal right ventricular systolic function.   10. Estimated right ventricular systolic pressure 41 mmHg (systolic blood pressure 143 mmHg).   11. Mildly enlarged proximal ascending aorta diameter of 42 mm.      Regarding coronary disease, he had an exercise stress test with nuclear myocardial imaging 6/1/2020, which demonstrated an excellent exercise tolerance, going 12.7 minutes on the Reji protocol, 142% of predicted functional aerobic capacity.  The radionuclide imaging showed no signs of stress-induced ischemia.  This was his fourth nuclear cardiac stress test    Hyperlipidemia in the context of coronary disease, on high intensity rosuvastatin with great control of lipids  Lipid panel done at [facility] July 15, 2022 had total cholesterol of 118, triglyceride 67, LDL 51, HDL 53     Hypertension, currently on combination of   lisinopril 20 mg once a day  diltiazem extended release 180 mg once a day  small dose of metoprolol succinate 12.5 mg a day  hydrochlorothiazide 25 mg a day--he try reducing to 12.5 mg a day, but that resulted in higher blood pressures, so he went back to 25 mg and feels fine    [facility] cardiology recommend to decrease or discontinue hydrochlorothiazide all together, BP permitting, in favor of up titrating BB and/or lisinopril.    BP Readings from Last 6 Encounters:   01/09/24 128/64   10/04/23 138/80   07/26/22 112/62   10/20/21 136/76   02/10/21 (!) 156/81   12/05/19 (!) 145/92     Hypothyroidism, on a stable dose of levothyroxine 125 mcg/day.  7-   TSH 0.9    Renal oncocytoma, status post resection of 2 lesions in 2014, low-grade neoplasm was again demonstrated on a fine-needle aspiration done at [facility] January 18, 2023, strategy is observation  status post ultrasound-guided cytology fine-needle aspiration of left kidney mass  on 01/18/2023. Pathology results showed; A. Kidney, Left mass, fine needle aspiration (smears/core biopsy): Positive for neoplasm. Low-grade oncocytic neoplasm, Favor oncocytoma.      7- [place]  1. Slight increase in size of the heterogeneously enhancing mass in the medial aspect of the lower pole of the left kidney. The mass now measures 3.0 cm.   2. Indeterminate 6 mm hypoattenuating lesion in the posterior aspect of the right kidney which has increased minimally since 07/08/2019 when it measured 4 mm. This lesion was not visible on CT examinations prior to 2019.   3. Stable 3.5 cm fusiform infrarenal abdominal aortic aneurysm.     Abdominal aortic aneurysm measured 3.5 cm by CT scan July 2021, continue annual surveillance.   7- ultrasound [place]  1. Stable 3.4 x 3.4 cm infrarenal abdominal aortic aneurysm.   2. 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     1-6-2023 infrarenal aorta measuring 35 mm    Benign prostatic hyperplasia, he struggles a bit empty his bladder completely.  I told him there are medication options to treat BPH to help empty his bladder.  One option might be tamsulosin 0.4 mg a day, although tamsulosin may lower blood pressure.  If he were to start tamsulosin, he may be necessary to back down on one of his other blood pressure medicines  PSA [facility] July 15, 2022  2.1    Personal history of tubular adenoma colon polyp  Colonoscopy April 26, 2021 notable for 2 polyps in the ascending colon, 6 mm, 5 mm.  One polyp ascending colon, 6 mm.  These were tubular adenomas, recheck recommended in 3 years which would be April 2024.  He should expect a reminder letter from [facility]    Improved weight  Wt Readings from Last 5 Encounters:   01/09/24 101.2 kg (223 lb)   07/26/22 98.4 kg (217 lb)   10/20/21 103.3 kg (227 lb 11.2 oz)   02/10/21 104.3 kg (230 lb)   12/05/19 103.9 kg (229 lb 0.6 oz)     He is going to check  his records, but I would suggest that he get the RSV vaccine at a local pharmacy    Immunization History   Administered Date(s) Administered    COVID-19 Monovalent 18+ (Moderna) 01/24/2021, 02/19/2021, 10/23/2021    Influenza (H1N1) 01/22/2010    Influenza Vaccine 18-64 (Flublok) 12/05/2019    Influenza Vaccine 65+ (Fluzone HD) 10/20/2021, 10/10/2022, 10/04/2023    Influenza Vaccine >6 months,quad, PF 10/31/2014    Influenza Vaccine, 6+MO IM (QUADRIVALENT W/PRESERVATIVES) 10/30/2015, 10/20/2020    Pneumo Conj 13-V (2010&after) 10/30/2015    Pneumococcal 23 valent 10/20/2021    Zoster recombinant adjuvanted (SHINGRIX) 10/10/2022, 03/31/2023

## 2024-01-15 ENCOUNTER — LAB (OUTPATIENT)
Dept: LAB | Facility: CLINIC | Age: 69
End: 2024-01-15
Payer: COMMERCIAL

## 2024-01-15 DIAGNOSIS — E78.5 HYPERLIPIDEMIA WITH TARGET LDL LESS THAN 70: ICD-10-CM

## 2024-01-15 DIAGNOSIS — Z12.5 SCREENING PSA (PROSTATE SPECIFIC ANTIGEN): ICD-10-CM

## 2024-01-15 DIAGNOSIS — Z00.00 ROUTINE GENERAL MEDICAL EXAMINATION AT A HEALTH CARE FACILITY: ICD-10-CM

## 2024-01-15 LAB
ALBUMIN SERPL BCG-MCNC: 4.4 G/DL (ref 3.5–5.2)
ALP SERPL-CCNC: 60 U/L (ref 40–150)
ALT SERPL W P-5'-P-CCNC: 21 U/L (ref 0–70)
ANION GAP SERPL CALCULATED.3IONS-SCNC: 10 MMOL/L (ref 7–15)
AST SERPL W P-5'-P-CCNC: 32 U/L (ref 0–45)
BILIRUB SERPL-MCNC: 0.5 MG/DL
BUN SERPL-MCNC: 14.8 MG/DL (ref 8–23)
CALCIUM SERPL-MCNC: 9.4 MG/DL (ref 8.8–10.2)
CHLORIDE SERPL-SCNC: 100 MMOL/L (ref 98–107)
CHOLEST SERPL-MCNC: 136 MG/DL
CREAT SERPL-MCNC: 1.06 MG/DL (ref 0.67–1.17)
DEPRECATED HCO3 PLAS-SCNC: 27 MMOL/L (ref 22–29)
EGFRCR SERPLBLD CKD-EPI 2021: 76 ML/MIN/1.73M2
ERYTHROCYTE [DISTWIDTH] IN BLOOD BY AUTOMATED COUNT: 12.9 % (ref 10–15)
FASTING STATUS PATIENT QL REPORTED: YES
GLUCOSE SERPL-MCNC: 98 MG/DL (ref 70–99)
HCT VFR BLD AUTO: 39.9 % (ref 40–53)
HDLC SERPL-MCNC: 43 MG/DL
HGB BLD-MCNC: 13.3 G/DL (ref 13.3–17.7)
LDLC SERPL CALC-MCNC: 78 MG/DL
MCH RBC QN AUTO: 29.7 PG (ref 26.5–33)
MCHC RBC AUTO-ENTMCNC: 33.3 G/DL (ref 31.5–36.5)
MCV RBC AUTO: 89 FL (ref 78–100)
NONHDLC SERPL-MCNC: 93 MG/DL
PLATELET # BLD AUTO: 213 10E3/UL (ref 150–450)
POTASSIUM SERPL-SCNC: 4.1 MMOL/L (ref 3.4–5.3)
PROT SERPL-MCNC: 7.1 G/DL (ref 6.4–8.3)
PSA SERPL DL<=0.01 NG/ML-MCNC: 2.84 NG/ML (ref 0–4.5)
RBC # BLD AUTO: 4.48 10E6/UL (ref 4.4–5.9)
SODIUM SERPL-SCNC: 137 MMOL/L (ref 135–145)
TRIGL SERPL-MCNC: 76 MG/DL
TSH SERPL DL<=0.005 MIU/L-ACNC: 1.74 UIU/ML (ref 0.3–4.2)
WBC # BLD AUTO: 5.2 10E3/UL (ref 4–11)

## 2024-01-15 PROCEDURE — 80061 LIPID PANEL: CPT

## 2024-01-15 PROCEDURE — 36415 COLL VENOUS BLD VENIPUNCTURE: CPT

## 2024-01-15 PROCEDURE — 80053 COMPREHEN METABOLIC PANEL: CPT

## 2024-01-15 PROCEDURE — G0103 PSA SCREENING: HCPCS

## 2024-01-15 PROCEDURE — 85027 COMPLETE CBC AUTOMATED: CPT

## 2024-01-15 PROCEDURE — 84443 ASSAY THYROID STIM HORMONE: CPT

## 2024-01-26 ENCOUNTER — PATIENT OUTREACH (OUTPATIENT)
Dept: GASTROENTEROLOGY | Facility: CLINIC | Age: 69
End: 2024-01-26
Payer: COMMERCIAL

## 2024-01-26 DIAGNOSIS — Z12.11 SPECIAL SCREENING FOR MALIGNANT NEOPLASMS, COLON: Primary | ICD-10-CM

## 2024-01-26 NOTE — PROGRESS NOTES
"Hx adenomatous polyps with 3 yr recall recommended on last colonoscopy performed in 2021    [facility] Screening Colonoscopy Referral Review    Patient meets the inclusion criteria for screening colonoscopy standing order.    Ordering/Referring Provider:  Christopher Early     BMI: Estimated body mass index is 28.25 kg/m  as calculated from the following:    Height as of 1/9/24: 1.892 m (6' 2.5\").    Weight as of 1/9/24: 101.2 kg (223 lb).     Sedation:  Does patient have any of the following conditions affecting sedation?  No medical conditions affecting sedation.    Previous Scopes:  Any previous recommendations or follow up needs based on previous scope?  na / No recommendations.    Medical Concerns to Postpone Order:  Does patient have any of the following medical concerns that should postpone/delay colonoscopy referral?  No medical conditions affecting colonoscopy referral.    Final Referral Details:  Based on patient's medical history patient is appropriate for referral order with moderate sedation. If patient's BMI > 50 do not schedule in ASC.  "

## 2024-04-29 ENCOUNTER — TRANSFERRED RECORDS (OUTPATIENT)
Dept: HEALTH INFORMATION MANAGEMENT | Facility: CLINIC | Age: 69
End: 2024-04-29
Payer: COMMERCIAL

## 2024-05-04 ENCOUNTER — MYC MEDICAL ADVICE (OUTPATIENT)
Dept: INTERNAL MEDICINE | Facility: CLINIC | Age: 69
End: 2024-05-04
Payer: COMMERCIAL

## 2024-06-24 ENCOUNTER — MYC MEDICAL ADVICE (OUTPATIENT)
Dept: INTERNAL MEDICINE | Facility: CLINIC | Age: 69
End: 2024-06-24
Payer: COMMERCIAL

## 2024-06-24 ENCOUNTER — NURSE TRIAGE (OUTPATIENT)
Dept: FAMILY MEDICINE | Facility: CLINIC | Age: 69
End: 2024-06-24
Payer: COMMERCIAL

## 2024-06-24 NOTE — TELEPHONE ENCOUNTER
"Nurse Triage SBAR    Is this a 2nd Level Triage? YES, LICENSED PRACTITIONER REVIEW IS REQUIRED    Situation:   Pt took double dose of Eliquis. Pt wanting to know if they need to do anything. They looked up side effects online. Pt sent [employer] message and then called for triage.    \"Having just started taking Eliquis following discovery of A-flutter during annual visit to [facility], I accidentally took a double dose this morning. Please let me know if I should be doing anything other than monitoring any potential bruising or bleeding.\"    Background:   06/19/2024: Pt sees cardiologist at [facility]. Pt reports Eliquis was prescribed, not noted on [facility] med list or active [facility] med list. Lisinopril and hydrochlorothiazide discontinued at appt.   Pt has not called their cardiologist.      Assessment:   Pt takes Eliquis twice daily. Pt took both doses this morning. Accidentally took night pills in pill organizer, so pt  took day pills too. Then realized morning and night had eliquis in each pill organizer.     Pt denies any current symptoms.     Protocol Recommended Disposition:   Call Transferred To PCP Now    Recommendation:   Pt would like PCP input.     Keyana Church RN    Routed to provider    Reason for Disposition   DOUBLE DOSE (an extra dose or lesser amount) of prescription drug and NO symptoms  (Exception: A double dose of antibiotics.)    Additional Information   Negative: Intentional drug overdose and suicidal thoughts or ideas   Negative: Drug overdose and triager unable to answer question   Negative: Caller requesting a renewal or refill of a medicine patient is currently taking   Negative: Caller requesting information unrelated to medicine   Negative: Caller requesting information about COVID-19 Vaccine   Negative: Caller requesting information about Emergency Contraception   Negative: Caller requesting information about Combined Birth Control Pills   Negative: Caller requesting information about " Progestin Birth Control Pills   Negative: Caller requesting information about Post-Op pain or medicines   Negative: Caller requesting a prescription antibiotic (such as penicillin) for Strep throat and has a positive culture result   Negative: Caller requesting a prescription anti-viral med (such as Tamiflu) and has influenza (flu) symptoms   Negative: Immunization reaction suspected   Negative: Rash while taking a medicine or within 3 days of stopping it   Negative: Asthma and having symptoms of asthma (cough, wheezing, etc.)   Negative: Symptom of illness (e.g., headache, abdominal pain, earache, vomiting) that are more than mild   Negative: Breastfeeding questions about mother's medicines and diet   Negative: MORE THAN A DOUBLE DOSE of a prescription or over-the-counter (OTC) drug   Negative: DOUBLE DOSE (an extra dose or lesser amount) of prescription drug and any symptoms (e.g., dizziness, nausea, pain, sleepiness)   Negative: DOUBLE DOSE (an extra dose or lesser amount) of over-the-counter (OTC) drug and any symptoms (e.g., dizziness, nausea, pain, sleepiness)   Negative: Took another person's prescription drug    Protocols used: Medication Question Call-A-OH

## 2024-06-24 NOTE — TELEPHONE ENCOUNTER
Called and relayed provider message in detail. Pt aware to monitor for s/s of bleeding, will call if he notices any. No further questions at this time.

## 2024-06-24 NOTE — TELEPHONE ENCOUNTER
As general advice, continuing with the regular dosing schedule without skipping the next dose is recommended.

## 2024-08-30 ENCOUNTER — OFFICE VISIT (OUTPATIENT)
Dept: INTERNAL MEDICINE | Facility: CLINIC | Age: 69
End: 2024-08-30
Payer: COMMERCIAL

## 2024-08-30 VITALS
HEIGHT: 75 IN | SYSTOLIC BLOOD PRESSURE: 128 MMHG | BODY MASS INDEX: 26.86 KG/M2 | OXYGEN SATURATION: 97 % | WEIGHT: 216 LBS | RESPIRATION RATE: 16 BRPM | DIASTOLIC BLOOD PRESSURE: 74 MMHG | TEMPERATURE: 98.3 F | HEART RATE: 62 BPM
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
## 2024-08-30 DIAGNOSIS — S80.862A INSECT BITE OF LEFT LOWER LEG, INITIAL ENCOUNTER: Primary | ICD-10-CM

## 2024-08-30 DIAGNOSIS — Z91.89 RISK OF EXPOSURE TO LYME DISEASE: ICD-10-CM

## 2024-08-30 PROCEDURE — 99213 OFFICE O/P EST LOW 20 MIN: CPT | Performed by: INTERNAL MEDICINE

## 2024-08-30 PROCEDURE — G2211 COMPLEX E/M VISIT ADD ON: HCPCS | Performed by: INTERNAL MEDICINE

## 2024-08-30 PROCEDURE — 86618 LYME DISEASE ANTIBODY: CPT | Performed by: INTERNAL MEDICINE

## 2024-08-30 PROCEDURE — 36415 COLL VENOUS BLD VENIPUNCTURE: CPT | Performed by: INTERNAL MEDICINE

## 2024-08-30 RX ORDER — APIXABAN 5 MG/1
5 TABLET, FILM COATED ORAL 2 TIMES DAILY
COMMUNITY

## 2024-08-30 RX ORDER — DOXYCYCLINE 100 MG/1
100 CAPSULE ORAL 2 TIMES DAILY
Qty: 28 CAPSULE | Refills: 0 | Status: SHIPPED | OUTPATIENT
Start: 2024-08-30 | End: 2024-09-13

## 2024-08-30 NOTE — PATIENT INSTRUCTIONS
Follow-up several issues, but also an acute problem which is a     Target rash, with some mild scaling, approximately 1.5 cm on his left posterior calf, which has been present for about 2 weeks, and he has been at risk of tick bites, since he has been spending lots of time in tall grass and in the brush    The lesion is still pretty small, and my suspicion for Lyme disease is not particularly high.  But lets run Lyme disease serologies, and I think as a preventative measure I will go ahead and put him on doxycycline 100 mg twice a day for 14 days.  Take with food.    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 from the executive health program and also [facility] cardiology and urology    Atrial flutter detected in June 2024 at [facility] when he was doing a cardiac stress test for his 's physical.  He can feel it when the flutter occurs.  Right now August 30, 2024 his rhythm is regular.    He continues taking diltiazem and metoprolol which may give him some rate control benefit.  He told me the plan is to follow-up with [facility] cardiology and may be undergoing an ablation procedure.    Hypertrophic cardiomyopathy and coronary artery disease, with history of two-vessel coronary bypass grafting in 2004, not experiencing any cardiac symptoms.  His [facility] cardiologist is Dr. Antione Grijalva. Cardiac MRI July 2019 reported hypertrophic cardiomyopathy with 21 mm septal thickness.       [place] exercise stress test performed on 07/18/2022 was negative for ischemia. The  was able to exercise for 9.3 minutes, which is 106.4% of predicted. He achieved 106% of his predicted maximum heart rate. The panel was favorable with a total cholesterol of 118 mg/dL.     7- [place] Echo  Final Impressions   1. Sigmoid ventricular septum with basal septal prominence: 20 mm (consistent with known diagnosis of hypertrophic cardiomyopathy).  2. Normal left ventricular  chamber size.   3. Calculated 2-D biplane volumetric left ventricular ejection fraction 61%.   4. No dynamic left ventricular outflow tract obstruction (at rest, with Valsalva maneuver).   5. Left ventricular outflow tract peak velocity with squat to stand 3.7 m/sec (55 mmHg).. Two dimensional imaging post  squat to stand was not adequate for visualization of the mitral valve, there was a slight increase in the degree of mitral   regurgitation post provocative maneuvers.   6. No systolic anterior motion of mitral apparatus at rest.   7. Mild mitral valve regurgitation.   8. Normal right ventricular chamber size.   9. Normal right ventricular systolic function.   10. Estimated right ventricular systolic pressure 41 mmHg (systolic blood pressure 143 mmHg).   11. Mildly enlarged proximal ascending aorta diameter of 42 mm.      Regarding coronary disease, he had an exercise stress test with nuclear myocardial imaging 6/1/2020, which demonstrated an excellent exercise tolerance, going 12.7 minutes on the Reji protocol, 142% of predicted functional aerobic capacity.  The radionuclide imaging showed no signs of stress-induced ischemia.  This was his fourth nuclear cardiac stress test     Hyperlipidemia in the context of coronary disease, on high intensity rosuvastatin with great control of lipids  6-  Cholesterol, LDL, Calculated  mg/dL 69     Cholesterol, HDL, S  >=40 mg/dL 53     Triglycerides  mg/dL 66     Hypertension, currently on combination of   lisinopril 20 mg once a day  diltiazem extended release 180 mg once a day  small dose of metoprolol succinate 12.5 mg a day  hydrochlorothiazide 25 mg a day--he try reducing to 12.5 mg a day, but that resulted in higher blood pressures, so he went back to 25 mg and feels fine     [facility] cardiology recommend to decrease or discontinue hydrochlorothiazide all together, BP permitting, in favor of up titrating BB and/or lisinopril.     BP Readings from Last 6 Encounters:    08/30/24 128/74   01/09/24 128/64   10/04/23 138/80   07/26/22 112/62   10/20/21 136/76   02/10/21 (!) 156/81     Hypothyroidism, on a stable dose of levothyroxine 125 mcg/day.  6-  TSH, Sensitive  0.3 - 4.2 mIU/L 1.6     Renal oncocytoma, status post resection of 2 lesions in 2014, low-grade neoplasm was again demonstrated on a fine-needle aspiration done at [facility] January 18, 2023, strategy is observation    6- CT at [facility]  A partially exophytic, heterogeneously enhancing mass arising from the medial aspect of the left lower pole measures approximately 2.6 cm maximally, only minimally enlarged dating back to 7/15/2022,     status post ultrasound-guided cytology fine-needle aspiration of left kidney mass on 01/18/2023. Pathology results showed; A. Kidney, Left mass, fine needle aspiration (smears/core biopsy): Positive for neoplasm. Low-grade oncocytic neoplasm, Favor oncocytoma.      Abdominal aortic aneurysm measured 3.5 cm by CT scan July 2021, continue annual surveillance    CT [facility] 6-  stable infrarenal abdominal aortic aneurysm, which measures approximately 3.6 cm in anteroposterior dimension.     7- ultrasound [facility]  1. Stable 3.4 x 3.4 cm infrarenal abdominal aortic aneurysm.   2. 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    Benign prostatic hyperplasia, he struggles a bit empty his bladder completely.  I told him there are medication options to treat BPH to help empty his bladder.  One option might be tamsulosin 0.4 mg a day, although tamsulosin may lower blood pressure.  If he were to start tamsulosin, he may be necessary to back down on one of his other blood pressure medicines  PSA [facility] July 15, 2022  2.1     Personal history of tubular adenoma colon polyp  Colonoscopy April 29, 2024 at [facility] revealed a tiny 3 mm polyp transverse colon, recheck suggested for 5 years later which should be in  2029  Colonoscopy April 26, 2021 notable for 2 polyps in the ascending colon, 6 mm, 5 mm.  One polyp ascending colon, 6 mm.  These were tubular adenomas, recheck recommended in 3 years which would be April 2024.     Improved weight  Wt Readings from Last 5 Encounters:   08/30/24 98 kg (216 lb)   01/09/24 101.2 kg (223 lb)   07/26/22 98.4 kg (217 lb)   10/20/21 103.3 kg (227 lb 11.2 oz)   02/10/21 104.3 kg (230 lb)

## 2024-08-30 NOTE — PROGRESS NOTES
Office Visit - Follow Up   Jan Reyers   68 year old male    Date of Visit: 8/30/2024    Chief Complaint   Patient presents with    Rash        -------------------------------------------------------------------------------------------------------------------------  Assessment and Plan    Follow-up several issues, but also an acute problem which is a     Target rash, with some mild scaling, approximately 1.5 cm on his left posterior calf, which has been present for about 2 weeks, and he has been at risk of tick bites, since he has been spending lots of time in tall grass and in the brush    The lesion is still pretty small, and my suspicion for Lyme disease is not particularly high.  But lets run Lyme disease serologies, and I think as a preventative measure I will go ahead and put him on doxycycline 100 mg twice a day for 14 days.  Take with food.    68-year-old man, retired  executive, who also gets medical care at [facility] from the executive health program and also [facility] cardiology and urology    Atrial flutter detected in June 2024 at [facility] when he was doing a cardiac stress test for his 's physical.  He can feel it when the flutter occurs.  Right now August 30, 2024 his rhythm is regular.    He continues taking diltiazem and metoprolol which may give him some rate control benefit.  He told me the plan is to follow-up with [facility] cardiology and may be undergoing an ablation procedure.    Hypertrophic cardiomyopathy and coronary artery disease, with history of two-vessel coronary bypass grafting in 2004, not experiencing any cardiac symptoms.  His [facility] cardiologist is Dr. Antione Grijalva. Cardiac MRI July 2019 reported hypertrophic cardiomyopathy with 21 mm septal thickness.       [place] exercise stress test performed on 07/18/2022 was negative for ischemia. The  was able to exercise for 9.3 minutes, which is 106.4% of predicted. He achieved 106% of his predicted  maximum heart rate. The panel was favorable with a total cholesterol of 118 mg/dL.     7- [place] [facility]  Final Impressions   1. Sigmoid ventricular septum with basal septal prominence: 20 mm (consistent with known diagnosis of hypertrophic cardiomyopathy).  2. Normal left ventricular chamber size.   3. Calculated 2-D biplane volumetric left ventricular ejection fraction 61%.   4. No dynamic left ventricular outflow tract obstruction (at rest, with Valsalva maneuver).   5. Left ventricular outflow tract peak velocity with squat to stand 3.7 m/sec (55 mmHg).. Two dimensional imaging post  squat to stand was not adequate for visualization of the mitral valve, there was a slight increase in the degree of mitral   regurgitation post provocative maneuvers.   6. No systolic anterior motion of mitral apparatus at rest.   7. Mild mitral valve regurgitation.   8. Normal right ventricular chamber size.   9. Normal right ventricular systolic function.   10. Estimated right ventricular systolic pressure 41 mmHg (systolic blood pressure 143 mmHg).   11. Mildly enlarged proximal ascending aorta diameter of 42 mm.      Regarding coronary disease, he had an exercise stress test with nuclear myocardial imaging 6/1/2020, which demonstrated an excellent exercise tolerance, going 12.7 minutes on the Reji protocol, 142% of predicted functional aerobic capacity.  The radionuclide imaging showed no signs of stress-induced ischemia.  This was his fourth nuclear cardiac stress test     Hyperlipidemia in the context of coronary disease, on high intensity rosuvastatin with great control of lipids    6-  Cholesterol, LDL, Calculated  mg/dL 69     Cholesterol, HDL, S  >=40 mg/dL 53     Triglycerides  mg/dL 66     Hypertension, currently on combination of   lisinopril 20 mg once a day  diltiazem extended release 180 mg once a day  small dose of metoprolol succinate 12.5 mg a day  hydrochlorothiazide 25 mg a day--he try reducing to 12.5  mg a day, but that resulted in higher blood pressures, so he went back to 25 mg and feels fine     [facility] cardiology recommend to decrease or discontinue hydrochlorothiazide all together, BP permitting, in favor of up titrating BB and/or lisinopril.     BP Readings from Last 6 Encounters:   08/30/24 128/74   01/09/24 128/64   10/04/23 138/80   07/26/22 112/62   10/20/21 136/76   02/10/21 (!) 156/81     Hypothyroidism, on a stable dose of levothyroxine 125 mcg/day.  6-  TSH, Sensitive  0.3 - 4.2 mIU/L 1.6     Renal oncocytoma, status post resection of 2 lesions in 2014, low-grade neoplasm was again demonstrated on a fine-needle aspiration done at [facility] January 18, 2023, strategy is observation    6- CT at [facility]  A partially exophytic, heterogeneously enhancing mass arising from the medial aspect of the left lower pole measures approximately 2.6 cm maximally, only minimally enlarged dating back to 7/15/2022,     status post ultrasound-guided cytology fine-needle aspiration of left kidney mass on 01/18/2023. Pathology results showed; A. Kidney, Left mass, fine needle aspiration (smears/core biopsy): Positive for neoplasm. Low-grade oncocytic neoplasm, Favor oncocytoma.      Abdominal aortic aneurysm measured 3.5 cm by CT scan July 2021, continue annual surveillance    CT [facility] 6-  stable infrarenal abdominal aortic aneurysm, which measures approximately 3.6 cm in anteroposterior dimension.     7- ultrasound [facility]  1. Stable 3.4 x 3.4 cm infrarenal abdominal aortic aneurysm.   2. 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    Benign prostatic hyperplasia, he struggles a bit empty his bladder completely.  I told him there are medication options to treat BPH to help empty his bladder.  One option might be tamsulosin 0.4 mg a day, although tamsulosin may lower blood pressure.  If he were to start tamsulosin, he may be necessary to back  down on one of his other blood pressure medicines  PSA [facility] July 15, 2022  2.1     Personal history of tubular adenoma colon polyp  Colonoscopy April 29, 2024 at [facility] revealed a tiny 3 mm polyp transverse colon, recheck suggested for 5 years later which should be in 2029  Colonoscopy April 26, 2021 notable for 2 polyps in the ascending colon, 6 mm, 5 mm.  One polyp ascending colon, 6 mm.  These were tubular adenomas, recheck recommended in 3 years which would be April 2024.     Improved weight  Wt Readings from Last 5 Encounters:   08/30/24 98 kg (216 lb)   01/09/24 101.2 kg (223 lb)   07/26/22 98.4 kg (217 lb)   10/20/21 103.3 kg (227 lb 11.2 oz)   02/10/21 104.3 kg (230 lb)           --------------------------------------------------------------------------------------------------------------------------  History of Present Illness  This 68 year old old     Reason for visit:  Rash   He is taking medications regularly.      Wt Readings from Last 3 Encounters:   08/30/24 98 kg (216 lb)   01/09/24 101.2 kg (223 lb)   07/26/22 98.4 kg (217 lb)     BP Readings from Last 3 Encounters:   08/30/24 128/74   01/09/24 128/64   10/04/23 138/80       ---------------------------------------------------------------------------------------------------------------------------    Medications, Allergies, Social, and Problem List       Current Outpatient Medications   Medication Sig Dispense Refill    aspirin 81 MG EC tablet [ASPIRIN 81 MG EC TABLET] Take 81 mg by mouth daily.      diltiazem ER COATED BEADS (CARDIZEM CD/CARTIA XT) 180 MG 24 hr capsule Take 1 capsule (180 mg) by mouth daily 90 capsule 3    ELIQUIS ANTICOAGULANT 5 MG tablet Take 5 mg by mouth 2 times daily.      hydrochlorothiazide (HYDRODIURIL) 25 MG tablet Take 1 tablet (25 mg) by mouth daily 90 tablet 3    levothyroxine (SYNTHROID/LEVOTHROID) 125 MCG tablet TAKE 1 TABLET BY MOUTH EVERY DAY AT 6AM 90 tablet 3    lisinopril (ZESTRIL) 20 MG tablet  "Take 1 tablet (20 mg) by mouth daily 90 tablet 3    metoprolol succinate ER (TOPROL XL) 25 MG 24 hr tablet Take 0.5 tablets (12.5 mg) by mouth daily 45 tablet 3    nitroGLYcerin (NITROSTAT) 0.4 MG sublingual tablet Place 1 tablet (0.4 mg) under the tongue every 5 minutes as needed for chest pain 30 tablet 3    rosuvastatin (CRESTOR) 40 MG tablet Take 1 tablet (40 mg) by mouth daily 90 tablet 3     No Known Allergies  Social History     Tobacco Use    Smoking status: Former     Current packs/day: 0.00     Types: Cigarettes     Quit date: 10/30/1965     Years since quittin.8     Passive exposure: Past    Smokeless tobacco: Never     Patient Active Problem List   Diagnosis    CAD (coronary artery disease)    Renal oncocytoma of right kidney    Hyperlipidemia LDL goal < 70    Hypothyroidism    Abdominal aortic aneurysm (AAA) (H24)    Hypertrophic cardiomyopathy (H)    Essential hypertension, benign    Benign prostatic hyperplasia with weak urinary stream    Personal history of colonic polyps    Overweight (BMI 25.0-29.9)        Reviewed, reconciled and updated       Physical Exam   General Appearance:       /74 (BP Location: Right arm, Patient Position: Sitting, Cuff Size: Adult Regular)   Pulse 62   Temp 98.3  F (36.8  C)   Resp 16   Ht 1.892 m (6' 2.5\")   Wt 98 kg (216 lb)   SpO2 97%   BMI 27.36 kg/m      Target rash, with some mild scaling, approximately 1.5 cm on his left posterior calf,      Additional Information   I spent 20 minutes on this encounter, including reviewing interval history since last visit, examining the patient, explaining and counseling the issues enumerated in the Assessment and Plan (patient given a copy), ordering indicated tests, ordering prescriptions    The longitudinal plan of care for the diagnosis(es)/condition(s) as documented were addressed during this visit. Due to the added complexity in care, I will continue to support Harjinder in the subsequent management and with " ongoing continuity of care.       NELI ADAMS MD, MD    Signed Electronically by: NELI ADAMS MD

## 2024-09-03 LAB — B BURGDOR IGG+IGM SER QL: 0.05

## 2024-11-27 ENCOUNTER — OFFICE VISIT (OUTPATIENT)
Dept: INTERNAL MEDICINE | Facility: CLINIC | Age: 69
End: 2024-11-27
Payer: COMMERCIAL

## 2024-11-27 VITALS
BODY MASS INDEX: 27.73 KG/M2 | HEIGHT: 75 IN | RESPIRATION RATE: 16 BRPM | WEIGHT: 223 LBS | SYSTOLIC BLOOD PRESSURE: 132 MMHG | HEART RATE: 55 BPM | OXYGEN SATURATION: 99 % | DIASTOLIC BLOOD PRESSURE: 82 MMHG | TEMPERATURE: 97.2 F

## 2024-11-27 DIAGNOSIS — I25.119 CORONARY ARTERY DISEASE INVOLVING NATIVE CORONARY ARTERY OF NATIVE HEART WITH ANGINA PECTORIS (H): ICD-10-CM

## 2024-11-27 DIAGNOSIS — I48.3 TYPICAL ATRIAL FLUTTER (H): Primary | ICD-10-CM

## 2024-11-27 DIAGNOSIS — Z98.890 S/P ABLATION OF ATRIAL FLUTTER: ICD-10-CM

## 2024-11-27 DIAGNOSIS — Z86.79 S/P ABLATION OF ATRIAL FLUTTER: ICD-10-CM

## 2024-11-27 RX ORDER — COLCHICINE 0.6 MG/1
0.3 TABLET ORAL
COMMUNITY
Start: 2024-11-20 | End: 2024-11-30

## 2024-11-27 NOTE — PROGRESS NOTES
Office Visit - Follow Up   Jan Reyers   69 year old male    Date of Visit: 11/27/2024    Chief Complaint   Patient presents with    Follow Up     [facility] for 1 day         -------------------------------------------------------------------------------------------------------------------------  Assessment and Plan    Follow-up after Mr. 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    On examination November 27, 2024, his heart rhythm is perfectly regular, and I do not hear any pericardial rub.    He continues on rate control medication diltiazem and metoprolol, and also is on anticoagulant Eliquis.    I reviewed recent lab work from [place], including a comprehensive metabolic panel and CBC from November 18, and they both look good.    Plan is that he is going to have a 30-day cardiac rhythm recording done at [facility], and might eventually do another cardiac stress test, and hopefully that will allow his 's medical certificate to be updated    Admission Date: 11/19/2024 Discharge Date: 11/19/2024   Status post catheter ablation, November 2024     10 days colchicine (COLCRYS) 0.6 MG tablet    ELIQUIS ANTICOAGULANT 5 MG tablet   diltiazem ER COATED BEADS (CARDIZEM CD/CARTIA XT) 180 MG 24 hr capsule   metoprolol succinate ER (TOPROL XL) 25 MG 24 hr tablet      Atrial flutter detected in June 2024 at [facility] when he was doing a cardiac stress test for his 's physical.     Hypertrophic cardiomyopathy and coronary artery disease, with history of two-vessel coronary bypass grafting in 2004, not experiencing any cardiac symptoms.  His [facility] cardiologist is Dr. Talbot  Rudi.   Cardiac MRI July 2019 reported hypertrophic cardiomyopathy with 21 mm septal thickness.       9- [place] [facility]  Final Impressions   1. Normal left ventricular chamber size, regional wall motion abnormalities were present (see wall motion graphics), calculated 2-D biplane volumetric ejection fraction of 53% With [id number] variability.   2. Sigmoid ventricular septum with basal septal prominence: 20 mm (known diagnosis of HCM)   3. No dynamic left ventricular outflow tract obstruction at rest or with Valsalva.   4. Mildly enlarged right ventricular chamber size, mildly reduced systolic function, estimated right ventricular systolic pressure 24 mmHg (systolic blood pressure 154 mmHg).   5. Sclerotic aortic valve.   6. Moderate tricuspid valve regurgitation, ERO (PISA) 0.58 cm2, regurgitant volume (PISA) 42 ml.   7. Mildly enlarged mid ascending aorta diameter of 45 mm, upper limit of normal for age, sex and BSA is 43 mm.   8. Mildly enlarged sinus of Valsalva diameter of 47 mm, upper limit of normal for age, sex and BSA is 43 mm.   9. Abdominal aortic aneurysm.   10. Normal inferior vena cava size with normal inspiratory collapse (>50%).   11. No  pericardial effusion.   12. Compared to the report of 07/15/2022 the following changes have occurred:  LVEF has decreased (there is [id number] variability).      Regarding coronary disease, he had an exercise stress test with nuclear myocardial imaging 6/1/2020, which demonstrated an excellent exercise tolerance, going 12.7 minutes on the Reji protocol, 142% of predicted functional aerobic capacity.  The radionuclide imaging showed no signs of stress-induced ischemia.  This was his fourth nuclear cardiac stress test     Hyperlipidemia in the context of coronary disease, on high intensity rosuvastatin with great control of lipids  6-  Cholesterol, LDL, Calculated  mg/dL 69      Cholesterol, HDL, S  >=40 mg/dL 53      Triglycerides  mg/dL 66       Hypertension, currently on combination of   lisinopril 20 mg once a day  diltiazem extended release 180 mg once a day  small dose of metoprolol succinate 12.5 mg a day  hydrochlorothiazide 25 mg a day--he try reducing to 12.5 mg a day, but that resulted in higher blood pressures, so he went back to 25 mg and feels fine     [place] cardiology recommend to decrease or discontinue hydrochlorothiazide all together, BP permitting, in favor of up titrating BB and/or lisinopril.     BP Readings from Last 6 Encounters:   11/27/24 132/82   08/30/24 128/74   01/09/24 128/64   10/04/23 138/80   07/26/22 112/62   10/20/21 136/76     Hypothyroidism, on a stable dose of levothyroxine 125 mcg/day.  6-  TSH, Sensitive  0.3 - 4.2 mIU/L 1.6      Renal oncocytoma, status post resection of 2 lesions in 2014, low-grade neoplasm was again demonstrated on a fine-needle aspiration done at [place] January 18, 2023, strategy is observation  6- CT at [place]  A partially exophytic, heterogeneously enhancing mass arising from the medial aspect of the left lower pole measures approximately 2.6 cm maximally, only minimally enlarged dating back to 7/15/2022,      status post ultrasound-guided cytology fine-needle aspiration of left kidney mass on 01/18/2023. Pathology results showed; A. Kidney, Left mass, fine needle aspiration (smears/core biopsy): Positive for neoplasm. Low-grade oncocytic neoplasm, Favor oncocytoma.      Abdominal aortic aneurysm measured 3.5 cm by CT scan July 2021, continue annual surveillance  CT [place] 6-  stable infrarenal abdominal aortic aneurysm, which measures approximately 3.6 cm in anteroposterior dimension.   7- ultrasound [place]  1. Stable 3.4 x 3.4 cm infrarenal abdominal aortic aneurysm.   2. 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     Benign prostatic hyperplasia, he struggles a bit empty his bladder completely.  I told him  there are medication options to treat BPH to help empty his bladder.  One option might be tamsulosin 0.4 mg a day, although tamsulosin may lower blood pressure.  If he were to start tamsulosin, he may be necessary to back down on one of his other blood pressure medicines  PSA [facility] July 15, 2022  2.1     Personal history of tubular adenoma colon polyp  Colonoscopy April 29, 2024 at [facility] revealed a tiny 3 mm polyp transverse colon, recheck suggested for 5 years later which should be in 2029  Colonoscopy April 26, 2021 notable for 2 polyps in the ascending colon, 6 mm, 5 mm.  One polyp ascending colon, 6 mm.  These were tubular adenomas     Improved weight  Wt Readings from Last 5 Encounters:   11/27/24 101.2 kg (223 lb)   08/30/24 98 kg (216 lb)   01/09/24 101.2 kg (223 lb)   07/26/22 98.4 kg (217 lb)   10/20/21 103.3 kg (227 lb 11.2 oz)     Onychomycosis (toenail fungus) which is pretty thick  I would like to see Mr. Barnes have a little more time under his belt after the atrial flutter ablation, then we might consider using antifungal antibiotic itraconazole or terbinafine for 3 to 6 months.    Flu shot today November 27, 2024  Immunization History   Administered Date(s) Administered    COVID-19 Monovalent 18+ (Moderna) 01/24/2021, 02/19/2021, 10/23/2021    Influenza (H1N1) 01/22/2010    Influenza (High Dose) Trivalent,PF (Fluzone) 10/10/2022    Influenza Vaccine 18-64 (Flublok) 12/05/2019    Influenza Vaccine 65+ (Fluzone HD) 10/20/2021, 10/10/2022, 10/04/2023    Influenza Vaccine >6 months,quad, PF 10/31/2014, 10/20/2020    Influenza Vaccine, 6+MO IM (QUADRIVALENT W/PRESERVATIVES) 10/30/2015, 10/20/2020    Pneumo Conj 13-V (2010&after) 10/30/2015    Pneumococcal 23 valent 10/20/2021    Zoster recombinant adjuvanted (SHINGRIX) 10/10/2022, 01/07/2023, 03/31/2023       --------------------------------------------------------------------------------------------------------------------------  History  of Present Illness  This 69 year old old   EP ablation of atrial flutter, done at [facility] November 19, 2024    He did experience some pericardial pain afterwards, and is finishing up a 10-day course of colchicine.    He continues on rate control medication diltiazem and metoprolol, and also is on anticoagulant Eliquis.    I reviewed recent lab work from [place], including a comprehensive metabolic panel and CBC from November 18, and they both look good.    Plan is that he is going to have a 30-day cardiac rhythm recording done at [place], and might eventually do another cardiac stress test, and hopefully that will allow his 's medical certificate to be updated    Wt Readings from Last 3 Encounters:   11/27/24 101.2 kg (223 lb)   08/30/24 98 kg (216 lb)   01/09/24 101.2 kg (223 lb)     BP Readings from Last 3 Encounters:   11/27/24 132/82   08/30/24 128/74   01/09/24 128/64       ---------------------------------------------------------------------------------------------------------------------------    Medications, Allergies, Social, and Problem List       Current Outpatient Medications   Medication Sig Dispense Refill    aspirin 81 MG EC tablet [ASPIRIN 81 MG EC TABLET] Take 81 mg by mouth daily.      colchicine (COLCRYS) 0.6 MG tablet Take 0.3 mg by mouth.      diltiazem ER COATED BEADS (CARDIZEM CD/CARTIA XT) 180 MG 24 hr capsule Take 1 capsule (180 mg) by mouth daily 90 capsule 3    ELIQUIS ANTICOAGULANT 5 MG tablet Take 5 mg by mouth 2 times daily.      hydrochlorothiazide (HYDRODIURIL) 25 MG tablet Take 1 tablet (25 mg) by mouth daily 90 tablet 3    levothyroxine (SYNTHROID/LEVOTHROID) 125 MCG tablet TAKE 1 TABLET BY MOUTH EVERY DAY AT 6AM 90 tablet 3    lisinopril (ZESTRIL) 20 MG tablet Take 1 tablet (20 mg) by mouth daily 90 tablet 3    metoprolol succinate ER (TOPROL XL) 25 MG 24 hr tablet Take 0.5 tablets (12.5 mg) by mouth daily 45 tablet 3    nitroGLYcerin (NITROSTAT) 0.4 MG sublingual  "tablet Place 1 tablet (0.4 mg) under the tongue every 5 minutes as needed for chest pain 30 tablet 3    rosuvastatin (CRESTOR) 40 MG tablet Take 1 tablet (40 mg) by mouth daily 90 tablet 3     No Known Allergies  Social History     Tobacco Use    Smoking status: Former     Current packs/day: 0.00     Types: Cigarettes     Quit date: 10/30/1965     Years since quittin.1     Passive exposure: Past    Smokeless tobacco: Never     Patient Active Problem List   Diagnosis    CAD (coronary artery disease)    Renal oncocytoma of right kidney    Hyperlipidemia LDL goal < 70    Hypothyroidism    Abdominal aortic aneurysm (AAA) (H)    Hypertrophic cardiomyopathy (H)    Essential hypertension, benign    Benign prostatic hyperplasia with weak urinary stream    Personal history of colon polyps, unspecified    Overweight (BMI 25.0-29.9)        Reviewed, reconciled and updated       Physical Exam   General Appearance:       /82 (BP Location: Right arm, Patient Position: Sitting, Cuff Size: Adult Regular)   Pulse 55   Temp 97.2  F (36.2  C) (Temporal)   Resp 16   Ht 1.892 m (6' 2.5\")   Wt 101.2 kg (223 lb)   SpO2 99%   BMI 28.25 kg/m      On examination 2024, his heart rhythm is perfectly regular, and I do not hear any pericardial rub.     Additional Information   I spent 20 minutes on this encounter, including reviewing interval history since last visit, examining the patient, explaining and counseling the issues enumerated in the Assessment and Plan (patient given a copy)    The longitudinal plan of care for the diagnosis(es)/condition(s) as documented were addressed during this visit. Due to the added complexity in care, I will continue to support Harjinder in the subsequent management and with ongoing continuity of care.       NELI ADAMS MD, MD      Signed Electronically by: NELI ADAMS MD    "

## 2024-11-27 NOTE — PATIENT INSTRUCTIONS
Follow-up after Mr. 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    On examination November 27, 2024, his heart rhythm is perfectly regular, and I do not hear any pericardial rub.    He continues on rate control medication diltiazem and metoprolol, and also is on anticoagulant Eliquis.    I reviewed recent lab work from male, including a comprehensive metabolic panel and CBC from November 18, and they both look good.    Plan is that he is going to have a 30-day cardiac rhythm recording done at [place], and might eventually do another cardiac stress test, and hopefully that will allow his 's medical certificate to be updated    Admission Date: 11/19/2024 Discharge Date: 11/19/2024   Status post catheter ablation, November 2024     10 days colchicine (COLCRYS) 0.6 MG tablet    ELIQUIS ANTICOAGULANT 5 MG tablet   diltiazem ER COATED BEADS (CARDIZEM CD/CARTIA XT) 180 MG 24 hr capsule   metoprolol succinate ER (TOPROL XL) 25 MG 24 hr tablet      Atrial flutter detected in June 2024 at [facility] when he was doing a cardiac stress test for his 's physical.     Hypertrophic cardiomyopathy and coronary artery disease, with history of two-vessel coronary bypass grafting in 2004, not experiencing any cardiac symptoms.  His [facility] cardiologist is Dr. Antione Grijalva.   Cardiac MRI July 2019 reported hypertrophic cardiomyopathy with 21 mm septal thickness.       9- [place] Echo  Final Impressions   1. Normal left ventricular chamber size, regional wall motion abnormalities were present (see wall motion graphics), calculated 2-D biplane volumetric ejection fraction of 53% With  [id number] variability.   2. Sigmoid ventricular septum with basal septal prominence: 20 mm (known diagnosis of HCM)   3. No dynamic left ventricular outflow tract obstruction at rest or with Valsalva.   4. Mildly enlarged right ventricular chamber size, mildly reduced systolic function, estimated right ventricular systolic pressure 24 mmHg (systolic blood pressure 154 mmHg).   5. Sclerotic aortic valve.   6. Moderate tricuspid valve regurgitation, ERO (PISA) 0.58 cm2, regurgitant volume (PISA) 42 ml.   7. Mildly enlarged mid ascending aorta diameter of 45 mm, upper limit of normal for age, sex and BSA is 43 mm.   8. Mildly enlarged sinus of Valsalva diameter of 47 mm, upper limit of normal for age, sex and BSA is 43 mm.   9. Abdominal aortic aneurysm.   10. Normal inferior vena cava size with normal inspiratory collapse (>50%).   11. No  pericardial effusion.   12. Compared to the report of 07/15/2022 the following changes have occurred:  LVEF has decreased (there is asfn-ly-rfly variability).      Regarding coronary disease, he had an exercise stress test with nuclear myocardial imaging 6/1/2020, which demonstrated an excellent exercise tolerance, going 12.7 minutes on the Reji protocol, 142% of predicted functional aerobic capacity.  The radionuclide imaging showed no signs of stress-induced ischemia.  This was his fourth nuclear cardiac stress test     Hyperlipidemia in the context of coronary disease, on high intensity rosuvastatin with great control of lipids  6-  Cholesterol, LDL, Calculated  mg/dL 69      Cholesterol, HDL, S  >=40 mg/dL 53      Triglycerides  mg/dL 66      Hypertension, currently on combination of   lisinopril 20 mg once a day  diltiazem extended release 180 mg once a day  small dose of metoprolol succinate 12.5 mg a day  hydrochlorothiazide 25 mg a day--he try reducing to 12.5 mg a day, but that resulted in higher blood pressures, so he went back to 25 mg and feels fine     Nguyen  cardiology recommend to decrease or discontinue hydrochlorothiazide all together, BP permitting, in favor of up titrating BB and/or lisinopril.     BP Readings from Last 6 Encounters:   11/27/24 132/82   08/30/24 128/74   01/09/24 128/64   10/04/23 138/80   07/26/22 112/62   10/20/21 136/76     Hypothyroidism, on a stable dose of levothyroxine 125 mcg/day.  6-  TSH, Sensitive  0.3 - 4.2 mIU/L 1.6      Renal oncocytoma, status post resection of 2 lesions in 2014, low-grade neoplasm was again demonstrated on a fine-needle aspiration done at [place] January 18, 2023, strategy is observation  6- CT at [place]  A partially exophytic, heterogeneously enhancing mass arising from the medial aspect of the left lower pole measures approximately 2.6 cm maximally, only minimally enlarged dating back to 7/15/2022,      status post ultrasound-guided cytology fine-needle aspiration of left kidney mass on 01/18/2023. Pathology results showed; A. Kidney, Left mass, fine needle aspiration (smears/core biopsy): Positive for neoplasm. Low-grade oncocytic neoplasm, Favor oncocytoma.      Abdominal aortic aneurysm measured 3.5 cm by CT scan July 2021, continue annual surveillance  CT [facility] 6-  stable infrarenal abdominal aortic aneurysm, which measures approximately 3.6 cm in anteroposterior dimension.   7- ultrasound [place]  1. Stable 3.4 x 3.4 cm infrarenal abdominal aortic aneurysm.   2. 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     Benign prostatic hyperplasia, he struggles a bit empty his bladder completely.  I told him there are medication options to treat BPH to help empty his bladder.  One option might be tamsulosin 0.4 mg a day, although tamsulosin may lower blood pressure.  If he were to start tamsulosin, he may be necessary to back down on one of his other blood pressure medicines  PSA [facility] July 15, 2022  2.1     Personal history of  tubular adenoma colon polyp  Colonoscopy April 29, 2024 at [facility] revealed a tiny 3 mm polyp transverse colon, recheck suggested for 5 years later which should be in 2029  Colonoscopy April 26, 2021 notable for 2 polyps in the ascending colon, 6 mm, 5 mm.  One polyp ascending colon, 6 mm.  These were tubular adenomas     Improved weight  Wt Readings from Last 5 Encounters:   11/27/24 101.2 kg (223 lb)   08/30/24 98 kg (216 lb)   01/09/24 101.2 kg (223 lb)   07/26/22 98.4 kg (217 lb)   10/20/21 103.3 kg (227 lb 11.2 oz)     Onychomycosis (toenail fungus) which is pretty thick  I would like to see Mr. Barnes have a little more time under his belt after the atrial flutter ablation, then we might consider using antifungal antibiotic itraconazole or terbinafine for 3 to 6 months.    Flu shot today November 27, 2024  Immunization History   Administered Date(s) Administered    COVID-19 Monovalent 18+ (Moderna) 01/24/2021, 02/19/2021, 10/23/2021    Influenza (H1N1) 01/22/2010    Influenza (High Dose) Trivalent,PF (Fluzone) 10/10/2022    Influenza Vaccine 18-64 (Flublok) 12/05/2019    Influenza Vaccine 65+ (Fluzone HD) 10/20/2021, 10/10/2022, 10/04/2023    Influenza Vaccine >6 months,quad, PF 10/31/2014, 10/20/2020    Influenza Vaccine, 6+MO IM (QUADRIVALENT W/PRESERVATIVES) 10/30/2015, 10/20/2020    Pneumo Conj 13-V (2010&after) 10/30/2015    Pneumococcal 23 valent 10/20/2021    Zoster recombinant adjuvanted (SHINGRIX) 10/10/2022, 01/07/2023, 03/31/2023

## 2024-12-10 ENCOUNTER — PATIENT OUTREACH (OUTPATIENT)
Dept: CARE COORDINATION | Facility: CLINIC | Age: 69
End: 2024-12-10
Payer: COMMERCIAL

## 2024-12-23 ENCOUNTER — MYC REFILL (OUTPATIENT)
Dept: INTERNAL MEDICINE | Facility: CLINIC | Age: 69
End: 2024-12-23
Payer: COMMERCIAL

## 2024-12-23 DIAGNOSIS — I48.3 TYPICAL ATRIAL FLUTTER (H): Primary | ICD-10-CM

## 2024-12-23 RX ORDER — APIXABAN 5 MG/1
5 TABLET, FILM COATED ORAL 2 TIMES DAILY
Qty: 180 TABLET | Refills: 1 | Status: SHIPPED | OUTPATIENT
Start: 2024-12-23

## 2024-12-24 ENCOUNTER — PATIENT OUTREACH (OUTPATIENT)
Dept: CARE COORDINATION | Facility: CLINIC | Age: 69
End: 2024-12-24
Payer: COMMERCIAL

## 2025-01-09 ENCOUNTER — TRANSFERRED RECORDS (OUTPATIENT)
Dept: HEALTH INFORMATION MANAGEMENT | Facility: CLINIC | Age: 70
End: 2025-01-09
Payer: COMMERCIAL

## 2025-01-12 DIAGNOSIS — E78.5 HYPERLIPIDEMIA WITH TARGET LDL LESS THAN 70: ICD-10-CM

## 2025-01-12 DIAGNOSIS — I10 ESSENTIAL HYPERTENSION, BENIGN: ICD-10-CM

## 2025-01-13 RX ORDER — HYDROCHLOROTHIAZIDE 25 MG/1
25 TABLET ORAL DAILY
Qty: 90 TABLET | Refills: 2 | Status: SHIPPED | OUTPATIENT
Start: 2025-01-13

## 2025-01-13 RX ORDER — ROSUVASTATIN CALCIUM 40 MG/1
40 TABLET, COATED ORAL DAILY
Qty: 90 TABLET | Refills: 2 | Status: SHIPPED | OUTPATIENT
Start: 2025-01-13

## 2025-01-13 RX ORDER — METOPROLOL SUCCINATE 25 MG/1
12.5 TABLET, EXTENDED RELEASE ORAL DAILY
Qty: 45 TABLET | Refills: 2 | Status: SHIPPED | OUTPATIENT
Start: 2025-01-13

## 2025-01-13 RX ORDER — DILTIAZEM HYDROCHLORIDE 180 MG/1
180 CAPSULE, COATED, EXTENDED RELEASE ORAL DAILY
Qty: 90 CAPSULE | Refills: 2 | Status: SHIPPED | OUTPATIENT
Start: 2025-01-13

## 2025-02-04 DIAGNOSIS — E03.9 HYPOTHYROIDISM, UNSPECIFIED TYPE: ICD-10-CM

## 2025-02-04 RX ORDER — LEVOTHYROXINE SODIUM 125 UG/1
TABLET ORAL
Qty: 90 TABLET | Refills: 3 | Status: SHIPPED | OUTPATIENT
Start: 2025-02-04

## 2025-02-16 ENCOUNTER — HEALTH MAINTENANCE LETTER (OUTPATIENT)
Age: 70
End: 2025-02-16

## 2025-02-23 ENCOUNTER — MYC MEDICAL ADVICE (OUTPATIENT)
Dept: INTERNAL MEDICINE | Facility: CLINIC | Age: 70
End: 2025-02-23
Payer: COMMERCIAL

## 2025-03-19 DIAGNOSIS — I10 ESSENTIAL HYPERTENSION: ICD-10-CM

## 2025-03-19 RX ORDER — LISINOPRIL 20 MG/1
20 TABLET ORAL DAILY
Qty: 90 TABLET | Refills: 3 | Status: SHIPPED | OUTPATIENT
Start: 2025-03-19

## 2025-05-06 ENCOUNTER — TELEPHONE (OUTPATIENT)
Dept: INTERNAL MEDICINE | Facility: CLINIC | Age: 70
End: 2025-05-06
Payer: COMMERCIAL

## 2025-05-06 NOTE — LETTER
May 6, 2025    Jan Reyers Jan Reyers  [address]  [place] [address]    Hello,     Your care team at [facility] values your health and well-being. After reviewing your chart, we have identified recommendation(s) to help you better manage your health.    It's time for your Medicare AWV. During your visit, we'll discuss your health, well-being, and any questions you may have related to preventive care. We'll also review any recommended tests, exams, or screenings you might need. To schedule please call [phone] ([phone]) or use your [employer] Twitter Dashboard account.     If you recently had or are having any of these services soon, please contact the clinic via phone or [employer] Twitter Dashboard so that your care team can update your records.    We look forward to seeing you at your upcoming visit.    If you have any questions or concerns, please contact our clinic. Thank you for continuing to trust us with your care.    Sincerely,    Your [facility] Care Team

## 2025-05-06 NOTE — TELEPHONE ENCOUNTER
Patient Quality Outreach    Patient is due for the following:   Physical Annual Wellness Visit    Action(s) Taken:   Patient to call back and schedule an AWV    Type of outreach:    Sent [employer] message.    Questions for provider review:    None         Joy C Steinert, [facility]  Chart routed to None.

## 2025-08-24 SDOH — HEALTH STABILITY: PHYSICAL HEALTH: ON AVERAGE, HOW MANY DAYS PER WEEK DO YOU ENGAGE IN MODERATE TO STRENUOUS EXERCISE (LIKE A BRISK WALK)?: 5 DAYS

## 2025-08-24 SDOH — HEALTH STABILITY: PHYSICAL HEALTH: ON AVERAGE, HOW MANY MINUTES DO YOU ENGAGE IN EXERCISE AT THIS LEVEL?: 30 MIN

## 2025-08-27 ENCOUNTER — OFFICE VISIT (OUTPATIENT)
Dept: INTERNAL MEDICINE | Facility: CLINIC | Age: 70
End: 2025-08-27
Payer: COMMERCIAL

## 2025-08-27 VITALS
WEIGHT: 209 LBS | TEMPERATURE: 98.1 F | SYSTOLIC BLOOD PRESSURE: 118 MMHG | DIASTOLIC BLOOD PRESSURE: 56 MMHG | HEIGHT: 75 IN | BODY MASS INDEX: 25.99 KG/M2 | OXYGEN SATURATION: 98 % | RESPIRATION RATE: 16 BRPM | HEART RATE: 55 BPM

## 2025-08-27 DIAGNOSIS — Z13.6 SCREENING FOR CARDIOVASCULAR CONDITION: ICD-10-CM

## 2025-08-27 DIAGNOSIS — Z12.5 SCREENING PSA (PROSTATE SPECIFIC ANTIGEN): ICD-10-CM

## 2025-08-27 DIAGNOSIS — I10 ESSENTIAL HYPERTENSION, BENIGN: ICD-10-CM

## 2025-08-27 DIAGNOSIS — R97.20 ELEVATED PROSTATE SPECIFIC ANTIGEN (PSA): ICD-10-CM

## 2025-08-27 DIAGNOSIS — Z00.00 ROUTINE GENERAL MEDICAL EXAMINATION AT A HEALTH CARE FACILITY: Primary | ICD-10-CM

## 2025-08-27 DIAGNOSIS — B35.1 ONYCHOMYCOSIS: ICD-10-CM

## 2025-08-27 DIAGNOSIS — I25.119 CORONARY ARTERY DISEASE INVOLVING NATIVE CORONARY ARTERY OF NATIVE HEART WITH ANGINA PECTORIS: ICD-10-CM

## 2025-08-27 PROBLEM — I42.2 HYPERTROPHIC CARDIOMYOPATHY (H): Status: RESOLVED | Noted: 2021-02-10 | Resolved: 2025-08-27

## 2025-08-27 PROCEDURE — 36415 COLL VENOUS BLD VENIPUNCTURE: CPT | Performed by: INTERNAL MEDICINE

## 2025-08-27 RX ORDER — TERBINAFINE HYDROCHLORIDE 250 MG/1
250 TABLET ORAL DAILY
Qty: 90 TABLET | Refills: 0 | Status: SHIPPED | OUTPATIENT
Start: 2025-08-27 | End: 2025-11-25

## 2025-08-28 LAB — PSA SERPL DL<=0.01 NG/ML-MCNC: 2.98 NG/ML (ref 0–4.5)
